# Patient Record
Sex: FEMALE | Race: WHITE
[De-identification: names, ages, dates, MRNs, and addresses within clinical notes are randomized per-mention and may not be internally consistent; named-entity substitution may affect disease eponyms.]

---

## 2020-12-26 ENCOUNTER — HOSPITAL ENCOUNTER (EMERGENCY)
Dept: HOSPITAL 33 - ED | Age: 85
Discharge: HOME | End: 2020-12-26
Payer: MEDICARE

## 2020-12-26 VITALS — SYSTOLIC BLOOD PRESSURE: 138 MMHG | OXYGEN SATURATION: 97 % | DIASTOLIC BLOOD PRESSURE: 66 MMHG | HEART RATE: 66 BPM

## 2020-12-26 DIAGNOSIS — E10.9: ICD-10-CM

## 2020-12-26 DIAGNOSIS — N39.0: Primary | ICD-10-CM

## 2020-12-26 DIAGNOSIS — Z79.899: ICD-10-CM

## 2020-12-26 LAB
GLUCOSE UR-MCNC: >=500 MG/DL
PROT UR STRIP-MCNC: 30 MG/DL
RBC #/AREA URNS HPF: (no result) /HPF (ref 0–2)
WBC #/AREA URNS HPF: >100 /HPF (ref 0–5)

## 2020-12-26 PROCEDURE — 81001 URINALYSIS AUTO W/SCOPE: CPT

## 2020-12-26 PROCEDURE — 87086 URINE CULTURE/COLONY COUNT: CPT

## 2020-12-26 PROCEDURE — 99283 EMERGENCY DEPT VISIT LOW MDM: CPT

## 2020-12-26 PROCEDURE — 87077 CULTURE AEROBIC IDENTIFY: CPT

## 2020-12-26 PROCEDURE — 87186 SC STD MICRODIL/AGAR DIL: CPT

## 2020-12-26 NOTE — ERPHSYRPT
- History of Present Illness


Time Seen by Provider: 12/26/20 12:33


Source: patient


Exam Limitations: no limitations


Patient Subjective Stated Complaint: Pt states "I think I have a bladder 

infection. IT really brurns when I pee."


Triage Nursing Assessment: Pt presented alert and oriented X 3, skin pwd pt 

ambualtes with an upright slow gait, able to speak in clear full setences.  Pt 

urine cloudy with foul smell.


Physician History: 





pt has hx of UTI every year or so and feels like one now.


No abd pain, no N/V, feeling well.


No findings on exam.


DM with sugars running OK.


Timing/Duration: today


Severity: mild


Associated Symptoms: denies symptoms


Allergies/Adverse Reactions: 








No Known Drug Allergies Allergy (Verified 12/26/20 12:05)


   





Home Medications: 








Atenolol 50 mg*** [Tenormin 50 mg***] 50 mg PO DAILY 12/26/20 [History]


Atorvastatin Calcium [Lipitor] 10 mg PO DAILY 12/26/20 [History]


Empagliflozin [Jardiance] 10 mg PO DAILY 12/26/20 [History]


Isosorbide Mononitrate [Isosorbide Mononitrate ER] 60 mg PO DAILY 12/26/20 

[History]


Linagliptin [Tradjenta] 5 mg PO DAILY 12/26/20 [History]


Warfarin Sodium 2 mg PO DAILY 12/26/20 [History]





Hx Tetanus, Diphtheria Vaccination/Date Given: Yes


Hx Influenza Vaccination/Date Given: Yes


Hx Pneumococcal Vaccination/Date Given: Yes


Immunizations Up to Date: Yes





Travel Risk





- International Travel


Have you traveled outside of the country in past 3 weeks: No





- Coronavirus Screening


Are you exhibiting any of the following symptoms?: No





- Review of Systems


Constitutional: No Fever, No Chills


Eyes: No Symptoms


Ears, Nose, & Throat: No Symptoms


Respiratory: No Cough, No Dyspnea


Cardiac: No Chest Pain, No Edema, No Syncope


Abdominal/Gastrointestinal: No Abdominal Pain, No Nausea, No Vomiting, No 

Diarrhea


Genitourinary Symptoms: Dysuria, Frequency


Musculoskeletal: No Back Pain, No Neck Pain


Skin: No Rash


Neurological: No Dizziness, No Focal Weakness, No Sensory Changes


Psychological: No Symptoms


Endocrine: No Symptoms


All Other Systems: Reviewed and Negative





- Past Medical History


Pertinent Past Medical History: Yes


Endocrine Medical History: Diabetes Type I





- Past Surgical History


Past Surgical History: Yes


Other Surgical History: cabg





- Social History


Smoking Status: Never smoker


Exposure to second hand smoke: No


Drug Use: none


Patient Lives Alone: No





- Female History


Hx Pregnant Now: No





- Nursing Vital Signs


Nursing Vital Signs: 





                               Initial Vital Signs











Temperature  97.8 F   12/26/20 11:59


 


Pulse Rate  66   12/26/20 11:59


 


Respiratory Rate  20   12/26/20 11:59


 


Blood Pressure  138/66   12/26/20 11:59


 


O2 Sat by Pulse Oximetry  97   12/26/20 11:59








                                   Pain Scale











Pain Intensity                 2

















- Physical Exam


General Appearance: no apparent distress, alert


Eye Exam: PERRL/EOMI, eyes nml inspection


Ears, Nose, Throat Exam: normal ENT inspection, TMs normal, pharynx normal, 

moist mucous membranes


Neck Exam: normal inspection, non-tender, supple, full range of motion


Respiratory Exam: normal breath sounds, lungs clear, No respiratory distress


Cardiovascular Exam: regular rate/rhythm, normal heart sounds, normal peripheral

 pulses


Gastrointestinal/Abdomen Exam: soft, normal bowel sounds, No tenderness, No mass


Back Exam: normal inspection, normal range of motion, No CVA tenderness, No 

vertebral tenderness


Extremity Exam: normal inspection, normal range of motion, pelvis stable


Neurologic Exam: alert, oriented x 3, cooperative, normal mood/affect, nml 

cerebellar function, nml station & gait, sensation nml, No motor deficits


Skin Exam: normal color, warm, dry, No rash


Lymphatic Exam: No adenopathy


**SpO2 Interpretation**: normal


SpO2: 97


O2 Delivery: Room Air





- Course


Nursing assessment & vital signs reviewed: Yes


Ordered Tests: 





                               Active Orders 24 hr











 Category Date Time Status


 


 CULTURE,URINE Stat Lab  12/26/20 12:10 Received


 


 UA W/RFX UR CULTURE Stat Lab  12/26/20 12:10 Completed











Lab/Rad Data: 





                               Laboratory Results











  12/26/20 Range/Units





  12:10 


 


Urine Color  YELLOW  (YELLOW)  


 


Urine Appearance  CLOUDY  (CLEAR)  


 


Urine pH  5.0  (5-6)  


 


Ur Specific Gravity  1.015  (1.005-1.025)  


 


Urine Protein  30  (Negative)  


 


Urine Ketones  NEGATIVE  (NEGATIVE)  


 


Urine Blood  SMALL  (0-5)  Kit/ul


 


Urine Nitrite  POSITIVE  (NEGATIVE)  


 


Urine Bilirubin  NEGATIVE  (NEGATIVE)  


 


Urine Urobilinogen  NEGATIVE  (0-1)  mg/dL


 


Ur Leukocyte Esterase  LARGE  (NEGATIVE)  


 


Urine WBC (Auto)  >100  (0-5)  /HPF


 


Urine RBC (Auto)  3-5  (0-2)  /HPF


 


U Hyaline Cast (Auto)  3-5  (0-2)  /LPF


 


U Epithel Cells (Auto)  RARE  (FEW)  /HPF


 


Urine Bacteria (Auto)  MODERATE  (NEGATIVE)  /HPF


 


Urine Mucus (Auto)  SLIGHT  (NEGATIVE)  /HPF


 


Urine Culture Reflexed  YES  (NO)  


 


Urine Glucose  >=500  (NEGATIVE)  mg/dL














- Progress


Progress: improved, re-examined


Counseled pt/family regarding: lab results, diagnosis, need for follow-up





- Departure


Departure Disposition: Home


Clinical Impression: 


 UTI (urinary tract infection)





Condition: Good


Critical Care Time: No


Referrals: 


SANJEEV CUNNINGHAM MD [Primary Care Provider] - 


Instructions:  Urinary Tract Infection, Adult (DC)


Additional Instructions: 


Followup with your DrLisandra to recheck urine, after antibiotics, return meantime if 

not improving or any concerns. 


Prescriptions: 


Cephalexin Mh 500 mg** [Keflex 500 mg**] 500 mg PO TID #30 capsule

## 2021-07-02 ENCOUNTER — HOSPITAL ENCOUNTER (EMERGENCY)
Dept: HOSPITAL 33 - ED | Age: 86
Discharge: ANOTHER HOSPITAL | End: 2021-07-02
Payer: MEDICARE

## 2021-07-02 VITALS — SYSTOLIC BLOOD PRESSURE: 182 MMHG | DIASTOLIC BLOOD PRESSURE: 104 MMHG

## 2021-07-02 VITALS — HEART RATE: 84 BPM

## 2021-07-02 VITALS — OXYGEN SATURATION: 99 %

## 2021-07-02 DIAGNOSIS — R53.83: ICD-10-CM

## 2021-07-02 DIAGNOSIS — R79.89: ICD-10-CM

## 2021-07-02 DIAGNOSIS — Z79.01: ICD-10-CM
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
## 2021-07-02 DIAGNOSIS — S72.002A: Primary | ICD-10-CM

## 2021-07-02 DIAGNOSIS — I48.91: ICD-10-CM

## 2021-07-02 DIAGNOSIS — M25.552: ICD-10-CM

## 2021-07-02 DIAGNOSIS — E10.22: ICD-10-CM

## 2021-07-02 DIAGNOSIS — M54.5: ICD-10-CM

## 2021-07-02 DIAGNOSIS — R53.1: ICD-10-CM
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
## 2021-07-02 DIAGNOSIS — N18.9: ICD-10-CM

## 2021-07-02 DIAGNOSIS — Z79.899: ICD-10-CM
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
## 2021-07-02 LAB
ALBUMIN SERPL-MCNC: 4.7 G/DL (ref 3.5–5)
ALP SERPL-CCNC: 77 U/L (ref 38–126)
ALT SERPL-CCNC: 33 U/L (ref 0–35)
ANION GAP SERPL CALC-SCNC: 18.1 MEQ/L (ref 5–15)
AST SERPL QL: 60 U/L (ref 14–36)
BASOPHILS # BLD AUTO: 0.03 10*3/UL (ref 0–0.4)
BASOPHILS NFR BLD AUTO: 0.4 % (ref 0–0.4)
BILIRUB BLD-MCNC: 1.2 MG/DL (ref 0.2–1.3)
BNP SERPL-MCNC: 2070 PG/ML (ref 0–1800)
BUN SERPL-MCNC: 35 MG/DL (ref 7–17)
CALCIUM SPEC-MCNC: 9.8 MG/DL (ref 8.4–10.2)
CHLORIDE SERPL-SCNC: 108 MMOL/L (ref 98–107)
CK SERPL-CCNC: 176 U/L (ref 30–135)
CO2 SERPL-SCNC: 19 MMOL/L (ref 22–30)
CREAT SERPL-MCNC: 1.11 MG/DL (ref 0.52–1.04)
EOSINOPHIL # BLD AUTO: 0.01 10*3/UL (ref 0–0.5)
GFR SERPLBLD BASED ON 1.73 SQ M-ARVRAT: 49.4 ML/MIN
GLUCOSE SERPL-MCNC: 180 MG/DL (ref 74–106)
GLUCOSE UR-MCNC: >=500 MG/DL
HCT VFR BLD AUTO: 51.5 % (ref 35–47)
HGB BLD-MCNC: 16.2 GM/DL (ref 12–16)
INR PPP: 1.05 (ref 0.8–3)
LYMPHOCYTES # SPEC AUTO: 0.78 10*3/UL (ref 1–4.6)
MCH RBC QN AUTO: 32 PG (ref 26–32)
MCHC RBC AUTO-ENTMCNC: 31.5 G/DL (ref 32–36)
MONOCYTES # BLD AUTO: 0.66 10*3/UL (ref 0–1.3)
PLATELET # BLD AUTO: 151 K/MM3 (ref 150–450)
POTASSIUM SERPLBLD-SCNC: 4.7 MMOL/L (ref 3.5–5.1)
PROT SERPL-MCNC: 8.5 G/DL (ref 6.3–8.2)
PROT UR STRIP-MCNC: 30 MG/DL
PROTHROMBIN TIME: 12.4 SECONDS (ref 9.4–12.5)
RBC # BLD AUTO: 5.07 M/MM3 (ref 4.1–5.4)
RBC #/AREA URNS HPF: (no result) /HPF (ref 0–2)
SODIUM SERPL-SCNC: 141 MMOL/L (ref 137–145)
WBC # BLD AUTO: 8.5 K/MM3 (ref 4–10.5)
WBC #/AREA URNS HPF: (no result) /HPF (ref 0–5)

## 2021-07-02 PROCEDURE — 70450 CT HEAD/BRAIN W/O DYE: CPT

## 2021-07-02 PROCEDURE — 99285 EMERGENCY DEPT VISIT HI MDM: CPT

## 2021-07-02 PROCEDURE — 93041 RHYTHM ECG TRACING: CPT

## 2021-07-02 PROCEDURE — 83880 ASSAY OF NATRIURETIC PEPTIDE: CPT

## 2021-07-02 PROCEDURE — 36415 COLL VENOUS BLD VENIPUNCTURE: CPT

## 2021-07-02 PROCEDURE — 81001 URINALYSIS AUTO W/SCOPE: CPT

## 2021-07-02 PROCEDURE — 51702 INSERT TEMP BLADDER CATH: CPT

## 2021-07-02 PROCEDURE — 73502 X-RAY EXAM HIP UNI 2-3 VIEWS: CPT

## 2021-07-02 PROCEDURE — 84484 ASSAY OF TROPONIN QUANT: CPT

## 2021-07-02 PROCEDURE — 85610 PROTHROMBIN TIME: CPT

## 2021-07-02 PROCEDURE — 93005 ELECTROCARDIOGRAM TRACING: CPT

## 2021-07-02 PROCEDURE — 36000 PLACE NEEDLE IN VEIN: CPT

## 2021-07-02 PROCEDURE — 80053 COMPREHEN METABOLIC PANEL: CPT

## 2021-07-02 PROCEDURE — 72131 CT LUMBAR SPINE W/O DYE: CPT

## 2021-07-02 PROCEDURE — 72125 CT NECK SPINE W/O DYE: CPT

## 2021-07-02 PROCEDURE — 82550 ASSAY OF CK (CPK): CPT

## 2021-07-02 PROCEDURE — 85025 COMPLETE CBC W/AUTO DIFF WBC: CPT

## 2021-07-02 PROCEDURE — 71045 X-RAY EXAM CHEST 1 VIEW: CPT

## 2021-07-02 NOTE — XRAY
Indication: Status post fall.



Multiple contiguous axial images obtained through the head without contrast.



Comparison: None



Age-appropriate global atrophy and mild periventricular degenerative

micro-ischemia bilaterally.  No acute intracranial hemorrhage, abnormal

extra-axial fluid collection, or mass effect.  Fourth ventricle is midline

without hydrocephalus.  Bony calvarium intact.  Visualized paranasal sinuses

and mastoid air cells are clear.



Impression: Nonacute senile brain.

## 2021-07-02 NOTE — XRAY
Indication: Status post fall.



Comparison: None



Portable chest rotated and clear with incidental right base calcified

granuloma.  Heart not enlarged with CABG surgery.  Bony thorax intact with

osteopenia, degenerative changes, and moderate levorotoscoliosis centered at

thoracolumbar junction.



Impression: Nonacute chest with chronic features.

## 2021-07-02 NOTE — XRAY
Indication: Status post fall.



Multiple contiguous axial images obtained through the cervical spine.

Sagittal and coronal reformatted images obtained.



Comparison: None



Osseous structures demineralized consistent with patient's age.  Axial images

negative for acute fracture, suspicious bony lesions, or spinal canal

stenosis.  There is mild/moderate multilevel degenerative endplate spurring

and mild/moderate multilevel bilateral degenerative facet hypertrophy greatest

at C4-C6 levels.  Also moderate atlantoaxial degenerative arthropathy.



Sagittal and coronal reformatted images demonstrates cervical lordosis with

C4-C7 disc space narrowing and minimal 1 mm C7 anterolisthesis.  No acute

compression fracture or jumped facet.  Normal appearing craniocervical

junction.



Visualized noncontrasted soft tissues demonstrates moderate scattered carotid

calcifications bilaterally.  Lung apices clear.



Impression:

1.  Negative acute fracture.

2.  Osteopenia and multilevel degenerative spondylosis including minimal C7

spondylolisthesis.

## 2021-07-02 NOTE — XRAY
Indication: Status post fall.



Multiple contiguous axial images obtained through the lumbar spine.  Sagittal

and coronal reformatted images obtained.



Comparison: None



Osseous structures demineralized consistent with patient's age.  Axial images

negative for acute fracture or suspicious bony lesions.  There is

moderate/advanced multilevel thoracolumbar degenerative endplate spurring and

mild/moderate multilevel bilateral degenerative facet hypertrophy.  Incidental

L4-L5 spinous process fixation hardware producing beam artifact.



Sagittal and coronal reformatted images demonstrates moderate dextroscoliosis

centered at L3 and 7-8mm anterolisthesis of L4 and L5.  Moderate/significant

disc space loss.  No acute compression fracture.



Visualized noncontrasted soft tissues demonstrates mild scattered aortoiliac

calcifications.



Impression:

1.  Negative acute fracture.

2.  Osteopenia, multilevel thoracolumbar degenerative spondylosis, grade 1-2

L4 spondylolisthesis with L4-L5 spinous fixation hardware, and dextroscoliosis.

## 2021-07-02 NOTE — ERPHSYRPT
- History of Present Illness


Time Seen by Provider: 07/02/21 11:55


Patient Subjective Stated Complaint: Pt fell at 0400 on a tile floor in her 

bathroom and was unable to get up until medics arrived at approx 1115, pt's husb

and could not hear her, pt c/o of lower back pain


Triage Nursing Assessment: Pt brought to the ER via EMS, hypertensive, rates 

back pain as 10/10, pain to left hip, bruising to medial back, bruising to left 

upper arm, pt denies hitting head or LOC, pulse weaker in right foot, unsure of 

what made her fall, incontinent of stool and urine, EMS noted that pt and 

 have dementia, pt A&O x2/4


Allergies/Adverse Reactions: 








No Known Drug Allergies Allergy (Verified 07/02/21 12:10)


   





Home Medications: 








Atenolol 50 mg*** [Tenormin 50 mg***] 50 mg PO DAILY 12/26/20 [History]


Atorvastatin Calcium [Lipitor] 10 mg PO DAILY 12/26/20 [History]


Empagliflozin [Jardiance] 10 mg PO DAILY 12/26/20 [History]


Isosorbide Mononitrate [Isosorbide Mononitrate ER] 60 mg PO DAILY 12/26/20 

[History]


Linagliptin [Tradjenta] 5 mg PO DAILY 12/26/20 [History]


Warfarin Sodium 2 mg PO DAILY 12/26/20 [History]





Hx Tetanus, Diphtheria Vaccination/Date Given: Yes


Hx Influenza Vaccination/Date Given: Yes


Hx Pneumococcal Vaccination/Date Given: Yes





Travel Risk





- International Travel


Have you traveled outside of the country in past 3 weeks: No





- Coronavirus Screening


Are you exhibiting any of the following symptoms?: No


Close contact with a COVID-19 positive Pt in past 14-21 Days: No





- Vaccine Status


Have you recieved a Covid-19 vaccination: Yes


: [employer]





- Past Medical History


Pertinent Past Medical History: Yes


Endocrine Medical History: Diabetes Type I


Other Medical History: poor historian





- Past Surgical History


Past Surgical History: Yes


Other Surgical History: cabg





- Social History


Smoking Status: Never smoker


Exposure to second hand smoke: No


Drug Use: none


Patient Lives Alone: No





- Female History


Hx Pregnant Now: No





- Nursing Vital Signs


Nursing Vital Signs: 





                               Initial Vital Signs











Temperature  97.0 F   07/02/21 11:53


 


Pulse Rate  107 H  07/02/21 11:53


 


Blood Pressure  191/113   07/02/21 11:53


 


O2 Sat by Pulse Oximetry  99   07/02/21 11:53








                                   Pain Scale











Pain Intensity                 10

















- Physical Exam


SpO2: 99


Ordered Tests: 





                               Active Orders 24 hr











 Category Date Time Status


 


 Cardiac Monitor STAT Care  07/02/21 12:20 Active


 


 EKG-ER Only STAT Care  07/02/21 12:20 Active


 


 IV Insertion STAT Care  07/02/21 12:20 Active


 


 CERVICAL SPINE WO CONTRAST [CT] Stat Exams  07/02/21 12:19 Ordered


 


 CHEST 1 VIEW (PORTABLE) Stat Exams  07/02/21 12:20 Ordered


 


 HEAD WITHOUT CONTRAST [CT] Stat Exams  07/02/21 12:19 Ordered


 


 HIP UNI (2V) INCL PEL IF DONE Stat Exams  07/02/21 Ordered


 


 LUMBAR SPINE W/O [CT] Stat Exams  07/02/21 12:19 Ordered


 


 CBC W DIFF Stat Lab  07/02/21 13:12 Completed


 


 CK-Creatinine Phosphokinase Stat Lab  07/02/21 12:52 Completed


 


 CMP Stat Lab  07/02/21 12:52 Completed


 


 NT PRO BNP Stat Lab  07/02/21 12:52 Completed


 


 TROPONIN Q3H Lab  07/02/21 12:52 Received


 


 TROPONIN Q3H Lab  07/02/21 15:30 Ordered


 


 TROPONIN Q3H Lab  07/02/21 18:30 Ordered


 


 TROPONIN Q3H Lab  07/02/21 21:30 Ordered


 


 TROPONIN Q3H Lab  07/03/21 00:30 Ordered


 


 UA W/RFX UR CULTURE Stat Lab  07/02/21 12:52 Completed








Medication Summary











Generic Name Dose Route Start Last Admin





  Trade Name Mich  PRN Reason Stop Dose Admin


 


Sodium Chloride  1,000 mls @ 100 mls/hr  07/02/21 12:30  07/02/21 12:30





  Sodium Chloride 0.9% 1000 Ml  IV  08/01/21 12:29  100 mls/hr





  .Q10H WILBER   Administration











Lab/Rad Data: 





                           Laboratory Result Diagrams





                                 07/02/21 13:12 





                                 07/02/21 12:52 





                               Laboratory Results











  07/02/21 07/02/21 07/02/21 Range/Units





  13:12 12:52 12:52 


 


WBC  8.5    (4.0-10.5)  K/mm3


 


RBC  5.07    (4.1-5.4)  M/mm3


 


Hgb  16.2 H    (12.0-16.0)  gm/dl


 


Hct  51.5 H    (35-47)  %


 


MCV  101.6 H    ()  fl


 


MCH  32.0    (26-32)  pg


 


MCHC  31.5 L    (32-36)  g/dl


 


RDW  14.4 H    (11.5-14.0)  %


 


Plt Count  151    (150-450)  K/mm3


 


MPV  10.0    (7.5-11.0)  fl


 


Gran %  82.5 H    (36.0-66.0)  %


 


Eos # (Auto)  0.01    (0-0.5)  


 


Absolute Lymphs (auto)  0.78 L    (1.0-4.6)  


 


Absolute Monos (auto)  0.66    (0.0-1.3)  


 


Lymphocytes %  9.2 L    (24.0-44.0)  %


 


Monocytes %  7.8    (0.0-12.0)  %


 


Eosinophils %  0.1    (0.00-5.0)  %


 


Basophils %  0.4    (0.0-0.4)  %


 


Absolute Granulocytes  7.00 H    (1.4-6.9)  


 


Basophils #  0.03    (0-0.4)  


 


Sodium    141  (137-145)  mmol/L


 


Potassium    4.7  (3.5-5.1)  mmol/L


 


Chloride    108 H  ()  mmol/L


 


Carbon Dioxide    19 L  (22-30)  mmol/L


 


Anion Gap    18.1 H  (5-15)  MEQ/L


 


BUN    35 H  (7-17)  mg/dL


 


Creatinine    1.11 H  (0.52-1.04)  mg/dL


 


Estimated GFR    49.4  ML/MIN


 


Glucose    180 H  ()  mg/dL


 


Calcium    9.8  (8.4-10.2)  mg/dL


 


Total Bilirubin    1.20  (0.2-1.3)  mg/dL


 


AST    60 H  (14-36)  U/L


 


ALT    33  (0-35)  U/L


 


Alkaline Phosphatase    77  ()  U/L


 


Creatine Kinase    176 H  ()  U/L


 


NT-Pro-B Natriuret Pep    2070 H  (0-1800)  pg/mL


 


Serum Total Protein    8.5 H  (6.3-8.2)  g/dL


 


Albumin    4.7  (3.5-5.0)  g/dL


 


Urine Color   STRAW   (YELLOW)  


 


Urine Appearance   CLEAR   (CLEAR)  


 


Urine pH   5.0   (5-6)  


 


Ur Specific Gravity   1.011   (1.005-1.025)  


 


Urine Protein   30   (Negative)  


 


Urine Ketones   NEGATIVE   (NEGATIVE)  


 


Urine Blood   SMALL   (0-5)  Kit/ul


 


Urine Nitrite   NEGATIVE   (NEGATIVE)  


 


Urine Bilirubin   NEGATIVE   (NEGATIVE)  


 


Urine Urobilinogen   NEGATIVE   (0-1)  mg/dL


 


Ur Leukocyte Esterase   NEGATIVE   (NEGATIVE)  


 


Urine WBC (Auto)   NONE   (0-5)  /HPF


 


Urine RBC (Auto)   3-5   (0-2)  /HPF


 


U Epithel Cells (Auto)   RARE   (FEW)  /HPF


 


Urine Bacteria (Auto)   RARE   (NEGATIVE)  /HPF


 


Urine Culture Reflexed   NO   (NO)  


 


Urine Glucose   >=500   (NEGATIVE)  mg/dL














- Departure


Referrals: 


SANJEEV CUNNINGHAM MD [Primary Care Provider] -

## 2021-07-02 NOTE — ERPHSYRPT
- History of Present Illness


Time Seen by Provider: 07/02/21 11:55


Source: patient, family, EMS


Exam Limitations: no limitations


Patient Subjective Stated Complaint: Pt fell at 0400 on a tile floor in her 

bathroom and was unable to get up until medics arrived at approx 1115, pt's 

 could not hear her, pt c/o of lower back pain


Triage Nursing Assessment: Pt brought to the ER via EMS, hypertensive, rates 

back pain as 10/10, pain to left hip, bruising to medial back, bruising to left 

upper arm, pt denies hitting head or LOC, pulse weaker in right foot, unsure of 

what made her fall, incontinent of stool and urine, EMS noted that pt and kyle escalante have dementia, pt A&O x2/4


Physician History: 





87 years old female with multiple medical problems including atrial fib flutter,

congestive heart failure, CAD status post CABG, dementia presented in the ER 

with chief complaint of fall 8 hours ago.  Patient reports she used bathroom 

this morning around 4 AM and then collapsed in front of sink landing on hardwood

floor on left hip and back, was weak enough that she stayed there until prior to

arrival when her  found her there.  She is not able to get up because of 

severe pain in the lower back and left hip, sharp shooting, aggravated with 

minimal movements.  Unsure about hitting her head.  No loss of consciousness.  

Denies any chest pain palpitations or shortness of breath before or after the 

fall.  Denies any abdominal pain nausea or vomiting.


Occurred: this morning, hours ago (8)


Injuries/Pain Location: back, pelvis, lower extremity


Loss of Consciousness: no loss of consciousness


Quality: sharpness


Severity of Pain-Max: severe


Severity of Pain-Current: severe


Modifying Factors: Improves With: immobilization.  Worsens With: movement


Associated Symptoms (Fall): back pain, extremity injury, muscle spasms, trouble 

walking


Allergies/Adverse Reactions: 








No Known Drug Allergies Allergy (Verified 07/02/21 12:10)


   





Home Medications: 








Atenolol 50 mg*** [Tenormin 50 mg***] 50 mg PO DAILY 12/26/20 [History]


Atorvastatin Calcium [Lipitor] 10 mg PO DAILY 12/26/20 [History]


Empagliflozin [Jardiance] 10 mg PO DAILY 12/26/20 [History]


Isosorbide Mononitrate [Isosorbide Mononitrate ER] 60 mg PO DAILY 12/26/20 

[History]


Linagliptin [Tradjenta] 5 mg PO DAILY 12/26/20 [History]


Warfarin Sodium 2 mg PO DAILY 12/26/20 [History]





Hx Tetanus, Diphtheria Vaccination/Date Given: Yes


Hx Influenza Vaccination/Date Given: Yes


Hx Pneumococcal Vaccination/Date Given: Yes





Travel Risk





- International Travel


Have you traveled outside of the country in past 3 weeks: No





- Coronavirus Screening


Are you exhibiting any of the following symptoms?: No


Close contact with a COVID-19 positive Pt in past 14-21 Days: No





- Vaccine Status


Have you recieved a Covid-19 vaccination: Yes


: [facility]





- Review of Systems


Constitutional: Fatigue, Weakness


Eyes: No Symptoms


Ears, Nose, & Throat: No Symptoms


Respiratory: No Symptoms


Cardiac: No Symptoms


Abdominal/Gastrointestinal: No Symptoms


Genitourinary Symptoms: No Symptoms


Musculoskeletal: Arthralgias, Back Pain, Fall, Injury, Joint Pain


Skin: No Symptoms


Neurological: No Symptoms


Psychological: No Symptoms


Endocrine: No Symptoms


Hematologic/Lymphatic: No Symptoms


Immunological/Allergic: No Symptoms





- Past Medical History


Pertinent Past Medical History: Yes


Endocrine Medical History: Diabetes Type I


Other Medical History: poor historian





- Past Surgical History


Past Surgical History: Yes


Other Surgical History: cabg





- Social History


Smoking Status: Never smoker


Exposure to second hand smoke: No


Drug Use: none


Patient Lives Alone: No





- Female History


Hx Pregnant Now: No





- Nursing Vital Signs


Nursing Vital Signs: 


                               Initial Vital Signs











Temperature  97.0 F   07/02/21 11:53


 


Pulse Rate  107 H  07/02/21 11:53


 


Blood Pressure  191/113   07/02/21 11:53


 


O2 Sat by Pulse Oximetry  99   07/02/21 11:53








                                   Pain Scale











Pain Intensity                 10

















- Wilson Coma Score


Best Eye Response (Adrien): (4) open spontaneously


Best Verbal Response (Adrien): (5) oriented


Best Motor Response (Adrien): (6) obeys commands


Adrien Total: 15





- Physical Exam


General Appearance: no apparent distress, alert


Head Injury: no evidence of injury, No active bleeding, No Ruiz's Sign, No co

ntusions, No raccoon eyes, No swelling, No tenderness


Eye Exam: PERRL/EOMI, eyes nml inspection


ENT Exam: airway nml, nml ext.inspection, No evidence of ENT injury, No dental 

injury


Neck Exam: supple, trachea midline, normal alignment, normal inspection, c-

collar in place


Respiratory/Chest Exam: normal breath sounds, respiratory distress, No chest 

tenderness


Cardiovascular Exam: normal heart sounds, regular rate/rhythm


Gastrointestinal Exam: soft, normal bowel sounds, No tenderness


Back Exam: normal inspection, vertebral tenderness, decreased range of motion, 

muscle spasm, point tenderness


Extremity Exam: normal inspection, capillary refill <3 sec, limited range of 

motion, bony point tenderness, hip tenderness


Neurologic Exam: alert, oriented x 3, cooperative, CNs II-XII nml as tested, nml

 cerebellar function, sensation nml, No motor deficits, No motor weakness


Skin Exam: normal color


**SpO2 Interpretation**: normal


SpO2: 99


O2 Delivery: Room Air





- Course


EKG Interpreted by Me: RATE (107), A-fib, Left Axis Deviation, Right Bundle 

Branch Block, Non-specific ST Changes, Other (Q wave.  Anteroseptal T wave 

inversion)


Ordered Tests: 


                               Active Orders 24 hr











 Category Date Time Status


 


 Cardiac Monitor STAT Care  07/02/21 12:20 Active


 


 EKG-ER Only STAT Care  07/02/21 12:20 Active


 


 IV Insertion STAT Care  07/02/21 12:20 Active


 


 CERVICAL SPINE WO CONTRAST [CT] Stat Exams  07/02/21 12:19 Completed


 


 CHEST 1 VIEW (PORTABLE) Stat Exams  07/02/21 12:20 Completed


 


 HEAD WITHOUT CONTRAST [CT] Stat Exams  07/02/21 12:19 Completed


 


 HIP UNI (2V) INCL PEL IF DONE Stat Exams  07/02/21 14:16 Completed


 


 LUMBAR SPINE W/O [CT] Stat Exams  07/02/21 12:19 Completed


 


 CBC W DIFF Stat Lab  07/02/21 13:12 Completed


 


 CK-Creatinine Phosphokinase Stat Lab  07/02/21 12:52 Completed


 


 CMP Stat Lab  07/02/21 12:52 Completed


 


 NT PRO BNP Stat Lab  07/02/21 12:52 Completed


 


 PT INR [PROTIME WITH INR] Stat Lab  07/02/21 14:43 Completed


 


 TROPONIN Q3H Lab  07/02/21 12:52 Completed


 


 TROPONIN Q3H Lab  07/02/21 14:30 Completed


 


 TROPONIN Q3H Lab  07/02/21 18:30 Ordered


 


 TROPONIN Q3H Lab  07/02/21 21:30 Ordered


 


 TROPONIN Q3H Lab  07/03/21 00:30 Ordered


 


 UA W/RFX UR CULTURE Stat Lab  07/02/21 12:52 Completed








Medication Summary











Generic Name Dose Route Start Last Admin





  Trade Name Mich  PRN Reason Stop Dose Admin


 


Sodium Chloride  1,000 mls @ 100 mls/hr  07/02/21 12:30  07/02/21 12:30





  Sodium Chloride 0.9% 1000 Ml  IV  08/01/21 12:29  100 mls/hr





  .Q10H WILBER   Administration











Lab/Rad Data: 


                           Laboratory Result Diagrams





                                 07/02/21 13:12 





                                 07/02/21 12:52 





                               Laboratory Results











  07/02/21 07/02/21 07/02/21 Range/Units





  14:43 14:30 13:12 


 


WBC    8.5  (4.0-10.5)  K/mm3


 


RBC    5.07  (4.1-5.4)  M/mm3


 


Hgb    16.2 H  (12.0-16.0)  gm/dl


 


Hct    51.5 H  (35-47)  %


 


MCV    101.6 H  ()  fl


 


MCH    32.0  (26-32)  pg


 


MCHC    31.5 L  (32-36)  g/dl


 


RDW    14.4 H  (11.5-14.0)  %


 


Plt Count    151  (150-450)  K/mm3


 


MPV    10.0  (7.5-11.0)  fl


 


Gran %    82.5 H  (36.0-66.0)  %


 


Eos # (Auto)    0.01  (0-0.5)  


 


Absolute Lymphs (auto)    0.78 L  (1.0-4.6)  


 


Absolute Monos (auto)    0.66  (0.0-1.3)  


 


Lymphocytes %    9.2 L  (24.0-44.0)  %


 


Monocytes %    7.8  (0.0-12.0)  %


 


Eosinophils %    0.1  (0.00-5.0)  %


 


Basophils %    0.4  (0.0-0.4)  %


 


Absolute Granulocytes    7.00 H  (1.4-6.9)  


 


Basophils #    0.03  (0-0.4)  


 


PT  12.4    (9.4-12.5)  SECONDS


 


INR  1.05    (0.8-3.0)  


 


Sodium     (137-145)  mmol/L


 


Potassium     (3.5-5.1)  mmol/L


 


Chloride     ()  mmol/L


 


Carbon Dioxide     (22-30)  mmol/L


 


Anion Gap     (5-15)  MEQ/L


 


BUN     (7-17)  mg/dL


 


Creatinine     (0.52-1.04)  mg/dL


 


Estimated GFR     ML/MIN


 


Glucose     ()  mg/dL


 


Calcium     (8.4-10.2)  mg/dL


 


Total Bilirubin     (0.2-1.3)  mg/dL


 


AST     (14-36)  U/L


 


ALT     (0-35)  U/L


 


Alkaline Phosphatase     ()  U/L


 


Creatine Kinase     ()  U/L


 


Troponin I   0.040 H*   (0.000-0.034)  ng/mL


 


NT-Pro-B Natriuret Pep     (0-1800)  pg/mL


 


Serum Total Protein     (6.3-8.2)  g/dL


 


Albumin     (3.5-5.0)  g/dL


 


Urine Color     (YELLOW)  


 


Urine Appearance     (CLEAR)  


 


Urine pH     (5-6)  


 


Ur Specific Gravity     (1.005-1.025)  


 


Urine Protein     (Negative)  


 


Urine Ketones     (NEGATIVE)  


 


Urine Blood     (0-5)  Kit/ul


 


Urine Nitrite     (NEGATIVE)  


 


Urine Bilirubin     (NEGATIVE)  


 


Urine Urobilinogen     (0-1)  mg/dL


 


Ur Leukocyte Esterase     (NEGATIVE)  


 


Urine WBC (Auto)     (0-5)  /HPF


 


Urine RBC (Auto)     (0-2)  /HPF


 


U Epithel Cells (Auto)     (FEW)  /HPF


 


Urine Bacteria (Auto)     (NEGATIVE)  /HPF


 


Urine Culture Reflexed     (NO)  


 


Urine Glucose     (NEGATIVE)  mg/dL














  07/02/21 07/02/21 07/02/21 Range/Units





  12:52 12:52 12:52 


 


WBC     (4.0-10.5)  K/mm3


 


RBC     (4.1-5.4)  M/mm3


 


Hgb     (12.0-16.0)  gm/dl


 


Hct     (35-47)  %


 


MCV     ()  fl


 


MCH     (26-32)  pg


 


MCHC     (32-36)  g/dl


 


RDW     (11.5-14.0)  %


 


Plt Count     (150-450)  K/mm3


 


MPV     (7.5-11.0)  fl


 


Gran %     (36.0-66.0)  %


 


Eos # (Auto)     (0-0.5)  


 


Absolute Lymphs (auto)     (1.0-4.6)  


 


Absolute Monos (auto)     (0.0-1.3)  


 


Lymphocytes %     (24.0-44.0)  %


 


Monocytes %     (0.0-12.0)  %


 


Eosinophils %     (0.00-5.0)  %


 


Basophils %     (0.0-0.4)  %


 


Absolute Granulocytes     (1.4-6.9)  


 


Basophils #     (0-0.4)  


 


PT     (9.4-12.5)  SECONDS


 


INR     (0.8-3.0)  


 


Sodium    141  (137-145)  mmol/L


 


Potassium    4.7  (3.5-5.1)  mmol/L


 


Chloride    108 H  ()  mmol/L


 


Carbon Dioxide    19 L  (22-30)  mmol/L


 


Anion Gap    18.1 H  (5-15)  MEQ/L


 


BUN    35 H  (7-17)  mg/dL


 


Creatinine    1.11 H  (0.52-1.04)  mg/dL


 


Estimated GFR    49.4  ML/MIN


 


Glucose    180 H  ()  mg/dL


 


Calcium    9.8  (8.4-10.2)  mg/dL


 


Total Bilirubin    1.20  (0.2-1.3)  mg/dL


 


AST    60 H  (14-36)  U/L


 


ALT    33  (0-35)  U/L


 


Alkaline Phosphatase    77  ()  U/L


 


Creatine Kinase    176 H  ()  U/L


 


Troponin I  0.041 H*    (0.000-0.034)  ng/mL


 


NT-Pro-B Natriuret Pep    2070 H  (0-1800)  pg/mL


 


Serum Total Protein    8.5 H  (6.3-8.2)  g/dL


 


Albumin    4.7  (3.5-5.0)  g/dL


 


Urine Color   STRAW   (YELLOW)  


 


Urine Appearance   CLEAR   (CLEAR)  


 


Urine pH   5.0   (5-6)  


 


Ur Specific Gravity   1.011   (1.005-1.025)  


 


Urine Protein   30   (Negative)  


 


Urine Ketones   NEGATIVE   (NEGATIVE)  


 


Urine Blood   SMALL   (0-5)  Kit/ul


 


Urine Nitrite   NEGATIVE   (NEGATIVE)  


 


Urine Bilirubin   NEGATIVE   (NEGATIVE)  


 


Urine Urobilinogen   NEGATIVE   (0-1)  mg/dL


 


Ur Leukocyte Esterase   NEGATIVE   (NEGATIVE)  


 


Urine WBC (Auto)   NONE   (0-5)  /HPF


 


Urine RBC (Auto)   3-5   (0-2)  /HPF


 


U Epithel Cells (Auto)   RARE   (FEW)  /HPF


 


Urine Bacteria (Auto)   RARE   (NEGATIVE)  /HPF


 


Urine Culture Reflexed   NO   (NO)  


 


Urine Glucose   >=500   (NEGATIVE)  mg/dL














- Progress


Progress: improved, pain not gone completely, re-examined


Progress Note: 





07/02/21 14:45


87-year-old is evaluated in the ER for with left hip and low back injury without

obvious head injury/loss of consciousness.  Patient is on Coumadin, I have 

obtained CT head and cervical spine which are negative for any acute trauma 

related findings.  CT lumbar spine is negative for acute fracture or subl

uxation.  She does have a PERC rock fracture left hip.  Chest x-ray negative for

any acute cardiopulmonary findings.  EKG showed atrial flutter rate controlled. 

Has mildly elevated troponin of 0.04.  She does not have any chest pain.  Since 

she has a hip fracture, will hold off on antiplatelets as patient probably needs

surgery in 1 or 2 days.  She has chronic kidney disease which is stable around 

baseline.  


07/02/21 15:59


Patient wanted to go to [place], discussed with Dr. EDMUND Mariscal orthopedic surgery 

and Dr. Stokes hospitalist, reviewed history, work-up, current management, 

agreed with transfer.


Counseled pt/family regarding: lab results, diagnosis, rad results





- Departure


Departure Disposition: Transfer


Clinical Impression: 


 Elevated troponin





Hip fracture


Qualifiers:


 Encounter type: initial encounter Fracture type: closed Laterality: left 

Qualified Code(s): S72.002A - Fracture of unspecified part of neck of left 

femur, initial encounter for closed fracture





Fall


Qualifiers:


 Encounter type: initial encounter Qualified Code(s): W19.XXXA - Unspecified 

fall, initial encounter





Condition: Stable


Critical Care Time: No


Referrals: 


SANJEEV CUNNINGHAM MD [Primary Care Provider] -

## 2021-07-02 NOTE — XRAY
Indication: Status post fall.



Comparison: None



AP and crosstable lateral left hip demonstrates nondisplaced intertrochanteric

fracture.  Elsewhere osteopenia and moderate lower lumbar degenerative

spondylosis with L4-L5 spinous process fusion hardware.

## 2021-11-20 ENCOUNTER — HOSPITAL ENCOUNTER (EMERGENCY)
Dept: HOSPITAL 33 - ED | Age: 86
Discharge: ANOTHER HOSPITAL | End: 2021-11-20
Payer: MEDICARE

## 2021-11-20 DIAGNOSIS — I10: ICD-10-CM

## 2021-11-20 DIAGNOSIS — R77.8: ICD-10-CM

## 2021-11-20 DIAGNOSIS — Z79.01: ICD-10-CM

## 2021-11-20 DIAGNOSIS — R31.9: ICD-10-CM

## 2021-11-20 DIAGNOSIS — E78.5: ICD-10-CM

## 2021-11-20 DIAGNOSIS — F03.90: ICD-10-CM

## 2021-11-20 DIAGNOSIS — Z79.4: ICD-10-CM

## 2021-11-20 DIAGNOSIS — E10.65: ICD-10-CM

## 2021-11-20 DIAGNOSIS — N39.0: Primary | ICD-10-CM

## 2021-11-20 DIAGNOSIS — I48.20: ICD-10-CM
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
## 2021-11-20 DIAGNOSIS — Z79.84: ICD-10-CM

## 2021-11-20 LAB
ALBUMIN SERPL-MCNC: 4.6 G/DL (ref 3.5–5)
ALP SERPL-CCNC: 133 U/L (ref 38–126)
ALT SERPL-CCNC: 24 U/L (ref 0–35)
ANION GAP SERPL CALC-SCNC: 18.8 MEQ/L (ref 5–15)
AST SERPL QL: 40 U/L (ref 14–36)
BASOPHILS # BLD AUTO: 0.02 10*3/UL (ref 0–0.4)
BASOPHILS NFR BLD AUTO: 0.2 % (ref 0–0.4)
BILIRUB BLD-MCNC: 1 MG/DL (ref 0.2–1.3)
BNP SERPL-MCNC: 1200 PG/ML (ref 0–1800)
BUN SERPL-MCNC: 21 MG/DL (ref 7–17)
CALCIUM SPEC-MCNC: 10.1 MG/DL (ref 8.4–10.2)
CHLORIDE SERPL-SCNC: 100 MMOL/L (ref 98–107)
CO2 SERPL-SCNC: 25 MMOL/L (ref 22–30)
CREAT SERPL-MCNC: 1.05 MG/DL (ref 0.52–1.04)
EOSINOPHIL # BLD AUTO: 0.07 10*3/UL (ref 0–0.5)
GFR SERPLBLD BASED ON 1.73 SQ M-ARVRAT: 52.7 ML/MIN
GLUCOSE SERPL-MCNC: 417 MG/DL (ref 74–106)
GLUCOSE UR-MCNC: >=500 MG/DL
HCT VFR BLD AUTO: 53.5 % (ref 35–47)
HGB BLD-MCNC: 16.8 GM/DL (ref 12–16)
INR PPP: 1.33 (ref 0.8–3)
LYMPHOCYTES # SPEC AUTO: 1.23 10*3/UL (ref 1–4.6)
MAGNESIUM SERPL-MCNC: 2 MG/DL (ref 1.6–2.3)
MCH RBC QN AUTO: 30.3 PG (ref 26–32)
MCHC RBC AUTO-ENTMCNC: 31.4 G/DL (ref 32–36)
MONOCYTES # BLD AUTO: 0.51 10*3/UL (ref 0–1.3)
PLATELET # BLD AUTO: 227 K/MM3 (ref 150–450)
POTASSIUM SERPLBLD-SCNC: 4.3 MMOL/L (ref 3.5–5.1)
PROT SERPL-MCNC: 8.4 G/DL (ref 6.3–8.2)
PROT UR STRIP-MCNC: 100 MG/DL
PROTHROMBIN TIME: 15.7 SECONDS (ref 9.4–12.5)
RBC # BLD AUTO: 5.54 M/MM3 (ref 4.1–5.4)
SODIUM SERPL-SCNC: 140 MMOL/L (ref 137–145)
TROPONIN T SERPL HS-MCNC: 0.04 NG/ML (ref 0–0.03)
WBC # BLD AUTO: 8.2 K/MM3 (ref 4–10.5)
WBC #/AREA URNS HPF: >100 /HPF (ref 0–5)

## 2021-11-20 PROCEDURE — 73502 X-RAY EXAM HIP UNI 2-3 VIEWS: CPT

## 2021-11-20 PROCEDURE — 99285 EMERGENCY DEPT VISIT HI MDM: CPT

## 2021-11-20 PROCEDURE — 72125 CT NECK SPINE W/O DYE: CPT

## 2021-11-20 PROCEDURE — 93005 ELECTROCARDIOGRAM TRACING: CPT

## 2021-11-20 PROCEDURE — 85025 COMPLETE CBC W/AUTO DIFF WBC: CPT

## 2021-11-20 PROCEDURE — 96372 THER/PROPH/DIAG INJ SC/IM: CPT

## 2021-11-20 PROCEDURE — 85610 PROTHROMBIN TIME: CPT

## 2021-11-20 PROCEDURE — 70450 CT HEAD/BRAIN W/O DYE: CPT

## 2021-11-20 PROCEDURE — 71045 X-RAY EXAM CHEST 1 VIEW: CPT

## 2021-11-20 PROCEDURE — 80053 COMPREHEN METABOLIC PANEL: CPT

## 2021-11-20 PROCEDURE — 96365 THER/PROPH/DIAG IV INF INIT: CPT

## 2021-11-20 PROCEDURE — 87086 URINE CULTURE/COLONY COUNT: CPT

## 2021-11-20 PROCEDURE — 83880 ASSAY OF NATRIURETIC PEPTIDE: CPT

## 2021-11-20 PROCEDURE — 81001 URINALYSIS AUTO W/SCOPE: CPT

## 2021-11-20 PROCEDURE — 84484 ASSAY OF TROPONIN QUANT: CPT

## 2021-11-20 PROCEDURE — 36415 COLL VENOUS BLD VENIPUNCTURE: CPT

## 2021-11-20 PROCEDURE — 83735 ASSAY OF MAGNESIUM: CPT

## 2021-11-20 NOTE — ERPHSYRPT
- History of Present Illness


Time Seen by Provider: 11/20/21 11:21


Source: patient, EMS


Exam Limitations: no limitations


Patient Subjective Stated Complaint: Fall


Triage Nursing Assessment: Patient brought into ED via EMS and transferred to 

bed with assist of 2. Patient A+O x 2 disoriented to time. Patient states she 

fell while getting out of bed landing her on bottom while trying to go to 

restroom. EMS states patient was urine soaked with feces on susan feet.  Patient 

poor historian. Patient denies pain or discomfort.


Physician History: 





87 years old female with history of coronary artery disease status post CABG, a

trial flutter/fibrillation on Coumadin, hypertension, hyperlipidemia, diabetes 

mellitus, baseline dementia, chronic back pain and recent fracture left hip 

status post ORIF presented in the ER after she tried to get up from bed and 

landed on her left hip and was unable to get up.  Did not remember hitting her 

head or loss of consciousness.  Patient was weak enough to get up.  She has a 

chronic difficulty movements of left lower extremity after recent fall and 

fracture repair.  She denies any headache, dizziness or lightheadedness.  No 

chest pain or palpitation/shortness of breath.  No fever or chills reported.  On

presentation patient was soaked in urine.  She is awake alert but not fully dave

ented.  Per  this is her baseline.


Occurred: just prior to arrival


Reason for Fall: lightheaded


Injuries/Pain Location: pelvis


Loss of Consciousness: no loss of consciousness


Quality: dullness


Severity of Pain-Max: mild


Severity of Pain-Current: mild


Modifying Factors: Improves With: nothing


Associated Symptoms (Fall): denies symptoms


Allergies/Adverse Reactions: 








No Known Drug Allergies Allergy (Verified 11/20/21 10:14)


   





Home Medications: 








Atorvastatin Calcium [Lipitor] 10 mg PO DAILY 12/26/20 [History]


Empagliflozin [Jardiance] 10 mg PO DAILY 12/26/20 [History]


Linagliptin [Tradjenta] 5 mg PO DAILY 12/26/20 [History]


Warfarin Sodium 2 mg PO DAILY 12/26/20 [History]


Lisinopril 20 mg*** [Zestril 20 MG***] 0.5 tab PO DAILY 11/20/21 [History]


Memantine HCl 5 mg*** [Namenda 5 MG***] 1 tab PO BID 11/20/21 [History]


Mirabegron [Myrbetriq] 1 tab PO DAILY 11/20/21 [History]


Rivaroxaban [Xarelto] 1 tab PO DAILY 11/20/21 [History]


Solifenacin Succinate [Vesicare] 1 tab PO DAILY 11/20/21 [History]





Hx Tetanus, Diphtheria Vaccination/Date Given: Yes


Hx Influenza Vaccination/Date Given: Yes


Hx Pneumococcal Vaccination/Date Given: Yes


Immunizations Up to Date: Yes





Travel Risk





- International Travel


Have you traveled outside of the country in past 3 weeks: No





- Coronavirus Screening


Are you exhibiting any of the following symptoms?: No


Close contact with a COVID-19 positive Pt in past 14-21 Days: No





- Vaccine Status


Have you recieved a Covid-19 vaccination: Yes


: Wayna





- Review of Systems


Constitutional: No Symptoms


Eyes: No Symptoms


Ears, Nose, & Throat: No Symptoms


Respiratory: No Symptoms


Cardiac: No Symptoms


Abdominal/Gastrointestinal: No Symptoms


Genitourinary Symptoms: No Symptoms


Musculoskeletal: No Symptoms


Skin: No Symptoms


Neurological: No Symptoms


Psychological: No Symptoms


Endocrine: No Symptoms


Hematologic/Lymphatic: No Symptoms


Immunological/Allergic: No Symptoms





- Past Medical History


Pertinent Past Medical History: Yes


Endocrine Medical History: Diabetes Type I


Other Medical History: poor historian





- Past Surgical History


Past Surgical History: Yes


Other Surgical History: cabg





- Social History


Smoking Status: Never smoker


Exposure to second hand smoke: No


Drug Use: none


Patient Lives Alone: No





- Female History


Hx Pregnant Now: No





- Nursing Vital Signs


Nursing Vital Signs: 


                               Initial Vital Signs











Temperature  97.8 F   11/20/21 10:15


 


Pulse Rate  122 H  11/20/21 10:15


 


Respiratory Rate  16   11/20/21 10:15


 


Blood Pressure  129/101   11/20/21 10:15








                                   Pain Scale











Pain Intensity                 0

















- [place] Coma Score


Best Eye Response (Adrien): (4) open spontaneously


Best Verbal Response ([place]): (5) oriented


Best Motor Response ([place]): (6) obeys commands


Adrien Total: 15





- Physical Exam


General Appearance: no apparent distress, alert


Head Injury: no evidence of injury, No active bleeding, No Ruiz's Sign, No 

contusions, No ecchymosis


Eye Exam: PERRL/EOMI, eyes nml inspection


ENT Exam: airway nml, No evidence of ENT injury


Neck Exam: supple, trachea midline, full range of motion, normal alignment, 

normal inspection


Respiratory/Chest Exam: normal breath sounds, respiratory distress, No chest 

tenderness


Cardiovascular Exam: normal heart sounds, tachycardia


Gastrointestinal Exam: soft, normal bowel sounds, No tenderness


Back Exam: normal inspection, normal range of motion


Extremity Exam: normal inspection, normal range of motion, capillary refill <3 

sec, pelvis stable


Neurologic Exam: alert, oriented x 3, cooperative, CNs II-XII nml as tested, 

sensation nml


Skin Exam: normal color


**SpO2 Interpretation**: normal


SpO2: 99


O2 Delivery: Room Air





- Course


EKG Interpreted by Me: RATE (121, atrial flutter), Right Axis Deviation, 

prolonged QT interval, NORMAL QRS, Non-specific ST Changes (Second EKG.  Time 

1541.  Rate 82.  Atrial flutter, right axis deviation, right bundle branch 

block.  Q waves.)


Ordered Tests: 


                               Active Orders 24 hr











 Category Date Time Status


 


 EKG-ER Only STAT Care  11/20/21 10:21 Active


 


 CERVICAL SPINE WO CONTRAST [CT] Stat Exams  11/20/21 10:21 Taken


 


 CHEST 1 VIEW (PORTABLE) Stat Exams  11/20/21 10:25 Taken


 


 HEAD WITHOUT CONTRAST [CT] Stat Exams  11/20/21 10:21 Taken


 


 HIP UNI (2V) INCL PEL IF DONE Stat Exams  11/20/21 Taken


 


 CBC W DIFF Stat Lab  11/20/21 09:45 Completed


 


 CMP Routine Lab  11/20/21 09:45 Completed


 


 CULTURE,URINE Stat Lab  11/20/21 10:25 Ordered


 


 MAG [MAGNESIUM] Stat Lab  11/20/21 09:45 Completed


 


 NT PRO BNP Stat Lab  11/20/21 09:45 Completed


 


 PROTIME WITH INR Stat Lab  11/20/21 09:45 Completed


 


 TROPONIN Q3H Lab  11/20/21 09:45 Completed


 


 TROPONIN Q3H Lab  11/20/21 14:20 Received


 


 TROPONIN Q3H Lab  11/20/21 16:05 Received


 


 TROPONIN Q3H Lab  11/20/21 19:30 Ordered


 


 TROPONIN Q3H Lab  11/20/21 22:30 Ordered


 


 UA W/RFX UR CULTURE Stat Lab  11/20/21 10:45 Completed








Medication Summary











Generic Name Dose Route Start Last Admin





  Trade Name Freq  PRN Reason Stop Dose Admin


 


Sodium Chloride  500 mls @ 50 mls/hr  11/20/21 11:45  11/20/21 11:34





  Sodium Chloride 0.9% 500 Ml  IV  12/20/21 11:44  50 mls/hr





  .Q10H WILBER   Administration


 


Diltiazem HCl  100 mls @ 5 mls/hr  11/20/21 15:33 





  Cardizem Drip 100 Mg/100 Ml D5w  IV  12/20/21 15:32 





  .Q20H PRN  





  HEART RATE/ A-FIB  





  Protocol  





  5 MG/HR  














Discontinued Medications














Generic Name Dose Route Start Last Admin





  Trade Name Freq  PRN Reason Stop Dose Admin


 


Ceftriaxone Sodium/Dextrose  2 g in 50 mls @ 100 mls/hr  11/20/21 12:15  

11/20/21 12:55





  Rocephin 2 Gm-D5w 50ml Bag**  IV  11/20/21 12:44  Infused





  STAT STA   Infusion


 


Ceftriaxone Sodium/Dextrose  Confirm  11/20/21 12:18 





  Rocephin 2 Gm-D5w 50ml Bag**  Administered  11/20/21 12:19 





  Dose  





  2 g in 50 mls @ ud  





  IV  





  .STK-MED ONE  


 


Insulin Human Regular  8 unit  11/20/21 15:14  11/20/21 15:51





  Insulin Regular, Human 1 Unit  SQ  11/20/21 15:15  8 unit





  STAT ONE   Administration


 


Insulin Human Regular  Confirm  11/20/21 15:50 





  Insulin Regular, Human 1 Unit  Administered  11/20/21 15:51 





  Dose  





  8 unit  





  .ROUTE  





  .STK-MED ONE  


 


Metoprolol Tartrate  5 mg  11/20/21 13:29  11/20/21 13:33





  Metoprolol Tartrate 5 Mg/5 Ml Injection  IV  11/20/21 13:30  5 mg





  STAT ONE   Administration


 


Metoprolol Tartrate  Confirm  11/20/21 13:32 





  Metoprolol Tartrate 5 Mg/5 Ml Injection  Administered  11/20/21 13:33 





  Dose  





  5 mg  





  IV  





  .STK-MED ONE  











Lab/Rad Data: 


                           Laboratory Result Diagrams





                                 11/20/21 09:45 





                                 11/20/21 09:45 





                               Laboratory Results











  11/20/21 11/20/21 11/20/21 Range/Units





  10:45 09:45 09:45 


 


WBC     (4.0-10.5)  K/mm3


 


RBC     (4.1-5.4)  M/mm3


 


Hgb     (12.0-16.0)  gm/dl


 


Hct     (35-47)  %


 


MCV     ()  fl


 


MCH     (26-32)  pg


 


MCHC     (32-36)  g/dl


 


RDW     (11.5-14.0)  %


 


Plt Count     (150-450)  K/mm3


 


MPV     (7.5-11.0)  fl


 


Gran %     (36.0-66.0)  %


 


Eos # (Auto)     (0-0.5)  


 


Absolute Lymphs (auto)     (1.0-4.6)  


 


Absolute Monos (auto)     (0.0-1.3)  


 


Lymphocytes %     (24.0-44.0)  %


 


Monocytes %     (0.0-12.0)  %


 


Eosinophils %     (0.00-5.0)  %


 


Basophils %     (0.0-0.4)  %


 


Absolute Granulocytes     (1.4-6.9)  


 


Basophils #     (0-0.4)  


 


PT   15.7 H   (9.4-12.5)  SECONDS


 


INR   1.33   (0.8-3.0)  


 


Sodium     (137-145)  mmol/L


 


Potassium     (3.5-5.1)  mmol/L


 


Chloride     ()  mmol/L


 


Carbon Dioxide     (22-30)  mmol/L


 


Anion Gap     (5-15)  MEQ/L


 


BUN     (7-17)  mg/dL


 


Creatinine     (0.52-1.04)  mg/dL


 


Estimated GFR     ML/MIN


 


Glucose     ()  mg/dL


 


Calcium     (8.4-10.2)  mg/dL


 


Magnesium    2.0  (1.6-2.3)  mg/dL


 


Total Bilirubin     (0.2-1.3)  mg/dL


 


AST     (14-36)  U/L


 


ALT     (0-35)  U/L


 


Alkaline Phosphatase     ()  U/L


 


Troponin I     (0.000-0.034)  ng/mL


 


NT-Pro-B Natriuret Pep    1200  (0-1800)  pg/mL


 


Serum Total Protein     (6.3-8.2)  g/dL


 


Albumin     (3.5-5.0)  g/dL


 


Urine Color  YELLOW    (YELLOW)  


 


Urine Appearance  TURBID    (CLEAR)  


 


Urine pH  5.0    (5-6)  


 


Ur Specific Gravity  1.016    (1.005-1.025)  


 


Urine Protein  100    (Negative)  


 


Urine Ketones  NEGATIVE    (NEGATIVE)  


 


Urine Blood  MODERATE    (0-5)  Kit/ul


 


Urine Nitrite  NEGATIVE    (NEGATIVE)  


 


Urine Bilirubin  NEGATIVE    (NEGATIVE)  


 


Urine Urobilinogen  NEGATIVE    (0-1)  mg/dL


 


Ur Leukocyte Esterase  LARGE    (NEGATIVE)  


 


Urine WBC (Auto)  >100    (0-5)  /HPF


 


Urine RBC (Auto)  16-25    (0-2)  /HPF


 


U Epithel Cells (Auto)  NONE    (FEW)  /HPF


 


Urine Bacteria (Auto)  RARE    (NEGATIVE)  /HPF


 


Urine Culture Reflexed  ORDERED SEPARATELY    (NO)  


 


Urine Glucose  >=500    (NEGATIVE)  mg/dL














  11/20/21 11/20/21 Range/Units





  09:45 09:45 


 


WBC   8.2  (4.0-10.5)  K/mm3


 


RBC   5.54 H  (4.1-5.4)  M/mm3


 


Hgb   16.8 H  (12.0-16.0)  gm/dl


 


Hct   53.5 H  (35-47)  %


 


MCV   96.6  ()  fl


 


MCH   30.3  (26-32)  pg


 


MCHC   31.4 L  (32-36)  g/dl


 


RDW   14.4 H  (11.5-14.0)  %


 


Plt Count   227  (150-450)  K/mm3


 


MPV   10.9  (7.5-11.0)  fl


 


Gran %   77.8 H  (36.0-66.0)  %


 


Eos # (Auto)   0.07  (0-0.5)  


 


Absolute Lymphs (auto)   1.23  (1.0-4.6)  


 


Absolute Monos (auto)   0.51  (0.0-1.3)  


 


Lymphocytes %   14.9 L  (24.0-44.0)  %


 


Monocytes %   6.2  (0.0-12.0)  %


 


Eosinophils %   0.9  (0.00-5.0)  %


 


Basophils %   0.2  (0.0-0.4)  %


 


Absolute Granulocytes   6.40  (1.4-6.9)  


 


Basophils #   0.02  (0-0.4)  


 


PT    (9.4-12.5)  SECONDS


 


INR    (0.8-3.0)  


 


Sodium  140   (137-145)  mmol/L


 


Potassium  4.3   (3.5-5.1)  mmol/L


 


Chloride  100   ()  mmol/L


 


Carbon Dioxide  25   (22-30)  mmol/L


 


Anion Gap  18.8 H   (5-15)  MEQ/L


 


BUN  21 H   (7-17)  mg/dL


 


Creatinine  1.05 H   (0.52-1.04)  mg/dL


 


Estimated GFR  52.7   ML/MIN


 


Glucose  417 H   ()  mg/dL


 


Calcium  10.1   (8.4-10.2)  mg/dL


 


Magnesium    (1.6-2.3)  mg/dL


 


Total Bilirubin  1.00   (0.2-1.3)  mg/dL


 


AST  40 H   (14-36)  U/L


 


ALT  24   (0-35)  U/L


 


Alkaline Phosphatase  133 H   ()  U/L


 


Troponin I  0.042 H*   (0.000-0.034)  ng/mL


 


NT-Pro-B Natriuret Pep    (0-1800)  pg/mL


 


Serum Total Protein  8.4 H   (6.3-8.2)  g/dL


 


Albumin  4.6   (3.5-5.0)  g/dL


 


Urine Color    (YELLOW)  


 


Urine Appearance    (CLEAR)  


 


Urine pH    (5-6)  


 


Ur Specific Gravity    (1.005-1.025)  


 


Urine Protein    (Negative)  


 


Urine Ketones    (NEGATIVE)  


 


Urine Blood    (0-5)  Kit/ul


 


Urine Nitrite    (NEGATIVE)  


 


Urine Bilirubin    (NEGATIVE)  


 


Urine Urobilinogen    (0-1)  mg/dL


 


Ur Leukocyte Esterase    (NEGATIVE)  


 


Urine WBC (Auto)    (0-5)  /HPF


 


Urine RBC (Auto)    (0-2)  /HPF


 


U Epithel Cells (Auto)    (FEW)  /HPF


 


Urine Bacteria (Auto)    (NEGATIVE)  /HPF


 


Urine Culture Reflexed    (NO)  


 


Urine Glucose    (NEGATIVE)  mg/dL














- Progress


Progress: improved, re-examined


Progress Note: 





11/20/21 15:55


87-year-old is evaluated for fall with generalized weakness to get up.  She does

 not have any focal neuro deficit on presentation in the ER.  She has little 

tenderness and pain with left hip movement and x-rays are obtained, reviewed by 

me did not reveal any obvious fracture dislocation and implant is well in place.

  EKG showed atrial fibrillation with RVR.  Patient felt dry and I have given 

her a small bolus of fluid and feeling better but still have tachycardia.  She 

is given Lopressor which did help with a heart rate for a bit but was again RVR.

  Started on Cardizem drip.  Patient work-up showed normal white count, 

chemistry profile showed some element of dehydration with a gap of 18.  Initial 

troponin 0 0.042 and repeat is 0.07 and patient does not have any chest pain at 

all.  Repeat EKG also did not show any ST elevations.  Chest x-ray reviewed by 

me did not reveal any obvious acute findings.  CT head and cervical spine is 

obtained which is negative and c-collar is removed.  Does have UTI and given a 

dose of Rocephin.  Discussed with Dr. Anaya, reviewed history, work-up, 

recommended transfer to facility with cardiology services.  Plan discussed with 

patient and  who understand and agree with it.  [facility] 

[facility].








11/20/21 16:08


Discussed with Dr. [facility], reviewed history work-up and 

agreed with transfer plan.


Discussed with : Jarrett, Other


Counseled pt/family regarding: lab results, diagnosis, need for follow-up, rad 

results





- Departure


Departure Disposition: Transfer


Clinical Impression: 


 Fall, Elevated troponin, Atrial fibrillation with RVR, Hyperglycemia





UTI (urinary tract infection)


Qualifiers:


 Urinary tract infection type: site unspecified Hematuria presence: with 

hematuria Qualified Code(s): N39.0 - Urinary tract infection, site not specified





Condition: Stable


Critical Care Time: No


Referrals: 


SANJEEV CUNNINGHAM MD [Primary Care Provider] - Follow up/PCP as directed

## 2021-11-20 NOTE — XRAY
Indication: Status post fall.  Confusion.



Multiple contiguous axial images obtained through the cervical spine.

Sagittal and coronal reformatted images obtained.



Comparison: July 2, 2021.



Stable osteopenia.  Axial images remain negative for acute fracture,

suspicious bony lesions, or spinal canal stenosis.  There remains

mild/moderate multilevel degenerative endplate spurring, mild/moderate

multilevel bilateral degenerative facet hypertrophy, and moderate atlantoaxial

degenerative arthropathy.  Sagittal and coronal reformatted images again

demonstrate normal alignment with C4-C7 degenerative disc space narrowing and

1 mm C7 anterolisthesis.  No acute compression fracture or jumped facet.

Normal appearing craniocervical junction.



Visualized noncontrasted soft tissues again demonstrates moderate bilateral

carotid calcifications.



Impression:

1.  Continued negative acute fracture.

2.  Again osteopenia, multilevel degenerative spondylosis, and minimal grade 1

C7 spondylolisthesis.



Common: Preliminary interpretation made by [facility].  No critical discrepancy.

## 2021-11-20 NOTE — XRAY
Indication: Status post fall.  Confusion.



Multiple contiguous axial images obtained through the head without contrast.



Comparison: July 2, 2021.



Again age-appropriate global atrophy, mild periventricular degenerative

micro-ischemia, and remote lacunar infarct right external capsule.  No acute

intracranial hemorrhage, abnormal extra-axial fluid collection, or mass

effect.  Fourth ventricle is midline without hydrocephalus.  Bony calvarium

intact.  Visualized paranasal sinuses and mastoid air cells are clear.



Impression: Continued nonacute senile brain with remote lacunar infarct right

external capsule.



Common: Preliminary interpretation made by [facility].  No critical discrepancy.

## 2021-11-20 NOTE — XRAY
Indication: Status post fall.



Comparison: July 2, 2021.



2 view left hip demonstrates new gamma nail, intramedullary negrita, and distal

transverse screw fixating previous intertrochanteric fracture.  Stable

osteopenia, lower lumbar spinous process fusion hardware, and scattered

vascular calcifications.  No other bony, articular, or soft tissue

abnormalities.

## 2021-11-20 NOTE — XRAY
Indication: Confusion.  Status post fall.



Comparison: July 2, 2021.



Portable chest hyperinflated and clear.  Heart not enlarged again with CABG.

Bony thorax intact again with osteopenia and degenerative changes.  No

new/acute findings.

## 2021-11-22 VITALS — SYSTOLIC BLOOD PRESSURE: 152 MMHG | HEART RATE: 102 BPM | DIASTOLIC BLOOD PRESSURE: 89 MMHG

## 2021-11-22 VITALS — OXYGEN SATURATION: 93 %

## 2022-01-15 ENCOUNTER — HOSPITAL ENCOUNTER (OUTPATIENT)
Dept: HOSPITAL 33 - ED | Age: 87
Setting detail: OBSERVATION
LOS: 5 days | Discharge: NURSING, REHAB OR LONG-TERM CARE | End: 2022-01-20
Attending: FAMILY MEDICINE | Admitting: FAMILY MEDICINE
Payer: MEDICARE

## 2022-01-15 DIAGNOSIS — E86.0: ICD-10-CM
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
## 2022-01-15 DIAGNOSIS — R32: ICD-10-CM

## 2022-01-15 DIAGNOSIS — N39.0: Primary | ICD-10-CM

## 2022-01-15 DIAGNOSIS — I25.10: ICD-10-CM

## 2022-01-15 DIAGNOSIS — F03.90: ICD-10-CM

## 2022-01-15 DIAGNOSIS — E11.65: ICD-10-CM

## 2022-01-15 DIAGNOSIS — Z20.828: ICD-10-CM

## 2022-01-15 DIAGNOSIS — Z79.899: ICD-10-CM

## 2022-01-15 LAB
ALBUMIN SERPL-MCNC: 4.4 G/DL (ref 3.5–5)
ALP SERPL-CCNC: 97 U/L (ref 38–126)
ALT SERPL-CCNC: 23 U/L (ref 0–35)
ANION GAP SERPL CALC-SCNC: 16.5 MEQ/L (ref 5–15)
AST SERPL QL: 34 U/L (ref 14–36)
BASE EXCESS BLDV CALC-SCNC: 2.5 MMOL/L (ref -2–2)
BASOPHILS # BLD AUTO: 0.01 10*3/UL (ref 0–0.4)
BILIRUB BLD-MCNC: 1.4 MG/DL (ref 0.2–1.3)
BUN SERPL-MCNC: 22 MG/DL (ref 7–17)
CALCIUM SPEC-MCNC: 9.8 MG/DL (ref 8.4–10.2)
CHLORIDE SERPL-SCNC: 98 MMOL/L (ref 98–107)
CO2 SERPL-SCNC: 27 MMOL/L (ref 22–30)
COHGB MFR BLDV: 3.2 % T HGB (ref 0–6.9)
CREAT SERPL-MCNC: 1.13 MG/DL (ref 0.52–1.04)
EOSINOPHIL # BLD AUTO: 0 10*3/UL (ref 0–0.5)
FLUAV AG NPH QL IA: NEGATIVE
FLUBV AG NPH QL IA: NEGATIVE
GFR SERPLBLD BASED ON 1.73 SQ M-ARVRAT: 48.4 ML/MIN
GLUCOSE SERPL-MCNC: 401 MG/DL (ref 74–106)
GLUCOSE UR-MCNC: >=500 MG/DL
HCO3 BLDV-SCNC: 27.9 MEQ/L (ref 22–28)
HCT VFR BLD AUTO: 50.3 % (ref 35–47)
HGB BLD-MCNC: 15.7 GM/DL (ref 12–16)
HGB BLDV-MCNC: 16.6 G/DL
INHALED O2 CONCENTRATION: 21 %
LIPASE SERPL-CCNC: 112 U/L (ref 23–300)
LYMPHOCYTES # SPEC AUTO: 1.23 10*3/UL (ref 1–4.6)
MAGNESIUM SERPL-MCNC: 1.8 MG/DL (ref 1.6–2.3)
MCH RBC QN AUTO: 31.6 PG (ref 26–32)
MCHC RBC AUTO-ENTMCNC: 31.2 G/DL (ref 32–36)
MONOCYTES # BLD AUTO: 0.65 10*3/UL (ref 0–1.3)
PCO2 BLDV: 45 MM/HG (ref 42–55)
PLATELET # BLD AUTO: 153 K/MM3 (ref 150–450)
PO2 BLDV: 19 MM/HG (ref 25–40)
POTASSIUM BLDV-SCNC: 4.8 MMOL/L (ref 3.5–5.1)
POTASSIUM SERPLBLD-SCNC: 4.7 MMOL/L (ref 3.5–5.1)
PROT SERPL-MCNC: 7.8 G/DL (ref 6.3–8.2)
PROT UR STRIP-MCNC: 100 MG/DL
RBC # BLD AUTO: 4.97 M/MM3 (ref 4.1–5.4)
RBC #/AREA URNS HPF: (no result) /HPF (ref 0–2)
RSV AG SPEC QL IA: NEGATIVE
SAO2 % BLDV: 41.1 % (ref 95–100)
SARS-COV-2 AG RESP QL IA.RAPID: NEGATIVE
SODIUM SERPL-SCNC: 137 MMOL/L (ref 137–145)
WBC # BLD AUTO: 7.7 K/MM3 (ref 4–10.5)
WBC #/AREA URNS HPF: >100 /HPF (ref 0–5)

## 2022-01-15 PROCEDURE — 84145 PROCALCITONIN (PCT): CPT

## 2022-01-15 PROCEDURE — 84484 ASSAY OF TROPONIN QUANT: CPT

## 2022-01-15 PROCEDURE — 83036 HEMOGLOBIN GLYCOSYLATED A1C: CPT

## 2022-01-15 PROCEDURE — 83690 ASSAY OF LIPASE: CPT

## 2022-01-15 PROCEDURE — 97161 PT EVAL LOW COMPLEX 20 MIN: CPT

## 2022-01-15 PROCEDURE — 70450 CT HEAD/BRAIN W/O DYE: CPT

## 2022-01-15 PROCEDURE — 80053 COMPREHEN METABOLIC PANEL: CPT

## 2022-01-15 PROCEDURE — 97530 THERAPEUTIC ACTIVITIES: CPT

## 2022-01-15 PROCEDURE — 80048 BASIC METABOLIC PNL TOTAL CA: CPT

## 2022-01-15 PROCEDURE — 99285 EMERGENCY DEPT VISIT HI MDM: CPT

## 2022-01-15 PROCEDURE — 87040 BLOOD CULTURE FOR BACTERIA: CPT

## 2022-01-15 PROCEDURE — 96365 THER/PROPH/DIAG IV INF INIT: CPT

## 2022-01-15 PROCEDURE — 87086 URINE CULTURE/COLONY COUNT: CPT

## 2022-01-15 PROCEDURE — 85025 COMPLETE CBC W/AUTO DIFF WBC: CPT

## 2022-01-15 PROCEDURE — 82805 BLOOD GASES W/O2 SATURATION: CPT

## 2022-01-15 PROCEDURE — 83735 ASSAY OF MAGNESIUM: CPT

## 2022-01-15 PROCEDURE — 96374 THER/PROPH/DIAG INJ IV PUSH: CPT

## 2022-01-15 PROCEDURE — 36000 PLACE NEEDLE IN VEIN: CPT

## 2022-01-15 PROCEDURE — 83605 ASSAY OF LACTIC ACID: CPT

## 2022-01-15 PROCEDURE — 82947 ASSAY GLUCOSE BLOOD QUANT: CPT

## 2022-01-15 PROCEDURE — 81001 URINALYSIS AUTO W/SCOPE: CPT

## 2022-01-15 PROCEDURE — 0241U: CPT

## 2022-01-15 PROCEDURE — 36415 COLL VENOUS BLD VENIPUNCTURE: CPT

## 2022-01-15 PROCEDURE — 71045 X-RAY EXAM CHEST 1 VIEW: CPT

## 2022-01-15 PROCEDURE — 93005 ELECTROCARDIOGRAM TRACING: CPT

## 2022-01-15 NOTE — ERPHSYRPT
- History of Present Illness


Time Seen by Provider: 01/15/22 19:21


Source: patient


Exam Limitations: no limitations


Patient Subjective Stated Complaint: EMS states pt has high blood sugar


Triage Nursing Assessment: pt came into the er via ambulance; pt is axo x1; c/o 

hyperglycemia; pt denies pain; EMS states blood sugar of 376; EMS states that pt

had fall earlier this today; pt has small laceration to left ear; pt is 

incontinent, depend was saturated; blood sugar 417 on arrival; hypertension


Physician History: 





87-year-old female with a history of poorly controlled diabetes mellitus, 

advanced age dementia, multiple falls, multiple UTIs, chronic urinary 

incontinence is brought in the ER with increasing generalized weakness fatigue 

and tiredness.  She fell earlier this morning hitting her left ear without any 

loss of consciousness.  On EMS arrival patient refused to be seen.  Patient is 

brought in the ER again with worsening of her weakness and refusing to eat or 

drink.  Patient blood sugars in 400s on presentation..  Patient is unable to 

take care of herself and her  also reports it is getting too hard to take

care of her.  She has not been taking medications as recommended.


Timing/Duration: yesterday, gradual onset, worse


Severity: moderate


Associated Symptoms: loss of appetite, malaise, weakness


Allergies/Adverse Reactions: 








No Known Drug Allergies Allergy (Verified 01/15/22 19:13)


   





Home Medications: 








Atorvastatin Calcium [Lipitor] 10 mg PO DAILY 12/26/20 [History]


Linagliptin [Tradjenta] 5 mg PO DAILY 12/26/20 [History]


Lisinopril 20 mg*** [Zestril 20 MG***] 0.5 tab PO DAILY 11/20/21 [History]


Memantine HCl 5 mg*** [Namenda 5 MG***] 1 tab PO BID 11/20/21 [History]


Mirabegron [Myrbetriq] 1 tab PO DAILY 11/20/21 [History]


Rivaroxaban [Xarelto] 1 tab PO DAILY 11/20/21 [History]


Solifenacin Succinate [Vesicare] 1 tab PO DAILY 11/20/21 [History]


Insulin Glargine,Hum.rec.anlog [Lantus] 6 unit SQ AC 01/15/22 [History]





Hx Tetanus, Diphtheria Vaccination/Date Given: Yes


Hx Influenza Vaccination/Date Given: Yes


Hx Pneumococcal Vaccination/Date Given: Yes





Travel Risk





- International Travel


Have you traveled outside of the country in past 3 weeks: No





- Coronavirus Screening


Are you exhibiting any of the following symptoms?: No


Close contact with a COVID-19 positive Pt in past 14-21 Days: No





- Vaccine Status


Have you recieved a Covid-19 vaccination: Yes


: [employer]pted





- Review of Systems


Constitutional: Fatigue, Weakness


Genitourinary Symptoms: Incontinence


All Other Systems: Unable due to dementia





- Past Medical History


Pertinent Past Medical History: Yes


Neurological History: Dementia


Endocrine Medical History: Diabetes Type I


Other Medical History: poor historian





- Past Surgical History


Past Surgical History: Yes


Other Surgical History: cabg





- Social History


Smoking Status: Never smoker


Exposure to second hand smoke: No


Drug Use: none


Patient Lives Alone: No





- Female History


Hx Pregnant Now: No





- Nursing Vital Signs


Nursing Vital Signs: 


                               Initial Vital Signs











Temperature  99 F   01/15/22 19:13


 


Pulse Rate  82   01/15/22 19:13


 


Respiratory Rate  16   01/15/22 19:13


 


Blood Pressure  188/88   01/15/22 19:13


 


O2 Sat by Pulse Oximetry  92 L  01/15/22 19:13








                                   Pain Scale











Pain Intensity                 0

















- Physical Exam


General Appearance: no apparent distress, alert


Eye Exam: PERRL/EOMI, eyes nml inspection


Ears, Nose, Throat Exam: other (Superficial laceration with dried blood left 

external ear)


Neck Exam: normal inspection, non-tender, supple, full range of motion


Respiratory Exam: normal breath sounds, lungs clear


Cardiovascular Exam: regular rate/rhythm, normal heart sounds


Gastrointestinal/Abdomen Exam: soft, normal bowel sounds, No tenderness


Back Exam: normal inspection, normal range of motion


Neurologic Exam: alert, oriented x 3, cooperative, CNs II-XII nml as tested


Skin Exam: normal color


**SpO2 Interpretation**: normal


SpO2: 96


O2 Delivery: Room Air





- Course


EKG Interpreted by Me: RATE (72.  Atrial flutter), Left Axis Deviation, pr

olonged QT interval, Right Bundle Branch Block, Non-specific ST Changes


Ordered Tests: 


                               Active Orders 24 hr











 Category Date Time Status


 


 EKG-ER Only STAT Care  01/15/22 19:45 Active


 


 IV Insertion STAT Care  01/15/22 19:45 Active


 


 CHEST 1 VIEW (PORTABLE) Stat Exams  01/15/22 19:45 Taken


 


 HEAD WITHOUT CONTRAST [CT] Stat Exams  01/15/22 22:04 Ordered


 


 BLOOD CULTURE Stat Lab  01/15/22 18:40 Received


 


 CBC W DIFF Stat Lab  01/15/22 19:59 Completed


 


 CMP Stat Lab  01/15/22 19:59 Completed


 


 CULTURE,URINE Stat Lab  01/15/22 19:59 Received


 


 LIPASE Stat Lab  01/15/22 19:59 Completed


 


 Lactic Acid Stat Lab  01/15/22 20:49 Completed


 


 MAGNESIUM Stat Lab  01/15/22 19:59 Completed


 


 POCT GLUCOSE Stat Lab  01/15/22 19:31 Completed


 


 TROPONIN Q3H Lab  01/15/22 20:00 Completed


 


 TROPONIN Q3H Lab  01/15/22 22:45 Ordered


 


 TROPONIN Q3H Lab  01/16/22 01:45 Ordered


 


 TROPONIN Q3H Lab  01/16/22 04:45 Ordered


 


 TROPONIN Q3H Lab  01/16/22 07:45 Ordered


 


 UA W/RFX UR CULTURE Stat Lab  01/15/22 19:59 Completed


 


 VENOUS BLOOD GAS Stat Lab  01/15/22 20:49 Completed


 


 Transfer Order Routine Transfer  01/15/22 Ordered








Medication Summary











Generic Name Dose Route Start Last Admin





  Trade Name Freq  PRN Reason Stop Dose Admin


 


Sodium Chloride  1,000 mls @ 125 mls/hr  01/15/22 19:45  01/15/22 19:54





  Sodium Chloride 0.9% 1000 Ml  IV  02/14/22 19:44  125 mls/hr





  .Q8H WILBER   Administration














Discontinued Medications














Generic Name Dose Route Start Last Admin





  Trade Name Freq  PRN Reason Stop Dose Admin


 


Ceftriaxone Sodium/Dextrose  2 g in 50 mls @ 100 mls/hr  01/15/22 21:22  

01/15/22 21:59





  Rocephin 2 Gm-D5w 50ml Bag**  IV  01/15/22 21:51  Infused





  STAT STA   Infusion


 


Ceftriaxone Sodium/Dextrose  Confirm  01/15/22 21:27 





  Rocephin 2 Gm-D5w 50ml Bag**  Administered  01/15/22 21:28 





  Dose  





  2 g in 50 mls @ ud  





  IV  





  .STK-MED ONE  


 


Insulin Human Regular  8 unit  01/15/22 21:21  01/15/22 21:27





  Insulin Regular, Human 1 Unit  IV  01/15/22 21:22  8 unit





  STAT ONE   Administration


 


Insulin Human Regular  Confirm  01/15/22 21:27 





  Insulin Regular, Human 1 Unit  Administered  01/15/22 21:28 





  Dose  





  8 unit  





  .ROUTE  





  .STK-MED ONE  











Lab/Rad Data: 


                           Laboratory Result Diagrams





                                 01/15/22 19:59 





                                 01/15/22 19:59 





                               Laboratory Results











  01/15/22 01/15/22 01/15/22 Range/Units





  20:49 20:49 20:00 


 


WBC     (4.0-10.5)  K/mm3


 


RBC     (4.1-5.4)  M/mm3


 


Hgb     (12.0-16.0)  gm/dl


 


Hct     (35-47)  %


 


MCV     ()  fl


 


MCH     (26-32)  pg


 


MCHC     (32-36)  g/dl


 


RDW     (11.5-14.0)  %


 


Plt Count     (150-450)  K/mm3


 


MPV     (7.5-11.0)  fl


 


Gran %     (36.0-66.0)  %


 


Eos # (Auto)     (0-0.5)  


 


Absolute Lymphs (auto)     (1.0-4.6)  


 


Absolute Monos (auto)     (0.0-1.3)  


 


Lymphocytes %     (24.0-44.0)  %


 


Monocytes %     (0.0-12.0)  %


 


Eosinophils %     (0.00-5.0)  %


 


Basophils %     (0.0-0.4)  %


 


Absolute Granulocytes     (1.4-6.9)  


 


Basophils #     (0-0.4)  


 


pO2/FiO2 Ratio  21.0    %


 


VBG pH  7.40    (7.32-7.42)  


 


VBG pCO2 at Pat Temp  45    (42-55)  mm/Hg


 


VBG pO2 at Pat Temp  19 L    (25-40)  mm/Hg


 


VBG HCO3  27.9    (22-28)  meq/L


 


VBG O2 Sat (Jordan)  41.1 L    ()  


 


VBG Base Excess  2.5 H    (-2.0-2.0)  


 


VBG Hemoglobin  16.6    


 


VBG Carboxyhemoglobin  3.2    (0.0-6.9)  % T HGB


 


POC Potassium  4.8    (3.5-5.1)  


 


Sodium     (137-145)  mmol/L


 


Potassium     (3.5-5.1)  mmol/L


 


Chloride     ()  mmol/L


 


Carbon Dioxide     (22-30)  mmol/L


 


Anion Gap     (5-15)  MEQ/L


 


BUN     (7-17)  mg/dL


 


Creatinine     (0.52-1.04)  mg/dL


 


Estimated GFR     ML/MIN


 


Glucose     ()  mg/dL


 


POC Glucometer     (74 to 106)  mg/dL


 


Lactic Acid   2.4 H   (0.4-2.0)  


 


Calcium     (8.4-10.2)  mg/dL


 


Magnesium     (1.6-2.3)  mg/dL


 


Total Bilirubin     (0.2-1.3)  mg/dL


 


AST     (14-36)  U/L


 


ALT     (0-35)  U/L


 


Alkaline Phosphatase     ()  U/L


 


Troponin I    0.019  (0.000-0.034)  ng/mL


 


Serum Total Protein     (6.3-8.2)  g/dL


 


Albumin     (3.5-5.0)  g/dL


 


Lipase     ()  U/L


 


Urine Color     (YELLOW)  


 


Urine Appearance     (CLEAR)  


 


Urine pH     (5-6)  


 


Ur Specific Gravity     (1.005-1.025)  


 


Urine Protein     (Negative)  


 


Urine Ketones     (NEGATIVE)  


 


Urine Blood     (0-5)  Kit/ul


 


Urine Nitrite     (NEGATIVE)  


 


Urine Bilirubin     (NEGATIVE)  


 


Urine Urobilinogen     (0-1)  mg/dL


 


Ur Leukocyte Esterase     (NEGATIVE)  


 


Urine WBC (Auto)     (0-5)  /HPF


 


Urine RBC (Auto)     (0-2)  /HPF


 


U Epithel Cells (Auto)     (FEW)  /HPF


 


Urine Bacteria (Auto)     (NEGATIVE)  /HPF


 


Urine Culture Reflexed     (NO)  


 


Urine Glucose     (NEGATIVE)  mg/dL














  01/15/22 01/15/22 01/15/22 Range/Units





  19:59 19:59 19:59 


 


WBC   7.7   (4.0-10.5)  K/mm3


 


RBC   4.97   (4.1-5.4)  M/mm3


 


Hgb   15.7   (12.0-16.0)  gm/dl


 


Hct   50.3 H   (35-47)  %


 


MCV   101.2 H   ()  fl


 


MCH   31.6   (26-32)  pg


 


MCHC   31.2 L   (32-36)  g/dl


 


RDW   14.6 H   (11.5-14.0)  %


 


Plt Count   153   (150-450)  K/mm3


 


MPV   10.8   (7.5-11.0)  fl


 


Gran %   75.4 H   (36.0-66.0)  %


 


Eos # (Auto)   0   (0-0.5)  


 


Absolute Lymphs (auto)   1.23   (1.0-4.6)  


 


Absolute Monos (auto)   0.65   (0.0-1.3)  


 


Lymphocytes %   16.0 L   (24.0-44.0)  %


 


Monocytes %   8.5   (0.0-12.0)  %


 


Eosinophils %   0.0   (0.00-5.0)  %


 


Basophils %   0.1   (0.0-0.4)  %


 


Absolute Granulocytes   5.78   (1.4-6.9)  


 


Basophils #   0.01   (0-0.4)  


 


pO2/FiO2 Ratio     %


 


VBG pH     (7.32-7.42)  


 


VBG pCO2 at Pat Temp     (42-55)  mm/Hg


 


VBG pO2 at Pat Temp     (25-40)  mm/Hg


 


VBG HCO3     (22-28)  meq/L


 


VBG O2 Sat (Jordan)     ()  


 


VBG Base Excess     (-2.0-2.0)  


 


VBG Hemoglobin     


 


VBG Carboxyhemoglobin     (0.0-6.9)  % T HGB


 


POC Potassium     (3.5-5.1)  


 


Sodium  137    (137-145)  mmol/L


 


Potassium  4.7    (3.5-5.1)  mmol/L


 


Chloride  98    ()  mmol/L


 


Carbon Dioxide  27    (22-30)  mmol/L


 


Anion Gap  16.5 H    (5-15)  MEQ/L


 


BUN  22 H    (7-17)  mg/dL


 


Creatinine  1.13 H    (0.52-1.04)  mg/dL


 


Estimated GFR  48.4    ML/MIN


 


Glucose  401 H    ()  mg/dL


 


POC Glucometer     (74 to 106)  mg/dL


 


Lactic Acid     (0.4-2.0)  


 


Calcium  9.8    (8.4-10.2)  mg/dL


 


Magnesium  1.8    (1.6-2.3)  mg/dL


 


Total Bilirubin  1.40 H    (0.2-1.3)  mg/dL


 


AST  34    (14-36)  U/L


 


ALT  23    (0-35)  U/L


 


Alkaline Phosphatase  97    ()  U/L


 


Troponin I     (0.000-0.034)  ng/mL


 


Serum Total Protein  7.8    (6.3-8.2)  g/dL


 


Albumin  4.4    (3.5-5.0)  g/dL


 


Lipase  112    ()  U/L


 


Urine Color    YELLOW  (YELLOW)  


 


Urine Appearance    CLOUDY  (CLEAR)  


 


Urine pH    5.0  (5-6)  


 


Ur Specific Gravity    1.021  (1.005-1.025)  


 


Urine Protein    100  (Negative)  


 


Urine Ketones    NEGATIVE  (NEGATIVE)  


 


Urine Blood    MODERATE  (0-5)  Kit/ul


 


Urine Nitrite    NEGATIVE  (NEGATIVE)  


 


Urine Bilirubin    NEGATIVE  (NEGATIVE)  


 


Urine Urobilinogen    NEGATIVE  (0-1)  mg/dL


 


Ur Leukocyte Esterase    LARGE  (NEGATIVE)  


 


Urine WBC (Auto)    >100  (0-5)  /HPF


 


Urine RBC (Auto)    6-10  (0-2)  /HPF


 


U Epithel Cells (Auto)    NONE  (FEW)  /HPF


 


Urine Bacteria (Auto)    FEW  (NEGATIVE)  /HPF


 


Urine Culture Reflexed    YES  (NO)  


 


Urine Glucose    >=500  (NEGATIVE)  mg/dL














  01/15/22 Range/Units





  19:31 


 


WBC   (4.0-10.5)  K/mm3


 


RBC   (4.1-5.4)  M/mm3


 


Hgb   (12.0-16.0)  gm/dl


 


Hct   (35-47)  %


 


MCV   ()  fl


 


MCH   (26-32)  pg


 


MCHC   (32-36)  g/dl


 


RDW   (11.5-14.0)  %


 


Plt Count   (150-450)  K/mm3


 


MPV   (7.5-11.0)  fl


 


Gran %   (36.0-66.0)  %


 


Eos # (Auto)   (0-0.5)  


 


Absolute Lymphs (auto)   (1.0-4.6)  


 


Absolute Monos (auto)   (0.0-1.3)  


 


Lymphocytes %   (24.0-44.0)  %


 


Monocytes %   (0.0-12.0)  %


 


Eosinophils %   (0.00-5.0)  %


 


Basophils %   (0.0-0.4)  %


 


Absolute Granulocytes   (1.4-6.9)  


 


Basophils #   (0-0.4)  


 


pO2/FiO2 Ratio   %


 


VBG pH   (7.32-7.42)  


 


VBG pCO2 at Pat Temp   (42-55)  mm/Hg


 


VBG pO2 at Pat Temp   (25-40)  mm/Hg


 


VBG HCO3   (22-28)  meq/L


 


VBG O2 Sat (Jordan)   ()  


 


VBG Base Excess   (-2.0-2.0)  


 


VBG Hemoglobin   


 


VBG Carboxyhemoglobin   (0.0-6.9)  % T HGB


 


POC Potassium   (3.5-5.1)  


 


Sodium   (137-145)  mmol/L


 


Potassium   (3.5-5.1)  mmol/L


 


Chloride   ()  mmol/L


 


Carbon Dioxide   (22-30)  mmol/L


 


Anion Gap   (5-15)  MEQ/L


 


BUN   (7-17)  mg/dL


 


Creatinine   (0.52-1.04)  mg/dL


 


Estimated GFR   ML/MIN


 


Glucose   ()  mg/dL


 


POC Glucometer  417 H  (74 to 106)  mg/dL


 


Lactic Acid   (0.4-2.0)  


 


Calcium   (8.4-10.2)  mg/dL


 


Magnesium   (1.6-2.3)  mg/dL


 


Total Bilirubin   (0.2-1.3)  mg/dL


 


AST   (14-36)  U/L


 


ALT   (0-35)  U/L


 


Alkaline Phosphatase   ()  U/L


 


Troponin I   (0.000-0.034)  ng/mL


 


Serum Total Protein   (6.3-8.2)  g/dL


 


Albumin   (3.5-5.0)  g/dL


 


Lipase   ()  U/L


 


Urine Color   (YELLOW)  


 


Urine Appearance   (CLEAR)  


 


Urine pH   (5-6)  


 


Ur Specific Gravity   (1.005-1.025)  


 


Urine Protein   (Negative)  


 


Urine Ketones   (NEGATIVE)  


 


Urine Blood   (0-5)  Kit/ul


 


Urine Nitrite   (NEGATIVE)  


 


Urine Bilirubin   (NEGATIVE)  


 


Urine Urobilinogen   (0-1)  mg/dL


 


Ur Leukocyte Esterase   (NEGATIVE)  


 


Urine WBC (Auto)   (0-5)  /HPF


 


Urine RBC (Auto)   (0-2)  /HPF


 


U Epithel Cells (Auto)   (FEW)  /HPF


 


Urine Bacteria (Auto)   (NEGATIVE)  /HPF


 


Urine Culture Reflexed   (NO)  


 


Urine Glucose   (NEGATIVE)  mg/dL














- Progress


Progress: improved


Progress Note: 





01/15/22 22:40


37-year-old is evaluated for generalized weakness fatigue tiredness and 

uncontrolled sugar.  She is given fluids, work-up showed mild ISABELLA and UTI, 

started on antibiotics.  Chest x-ray negative for any acute cardiopulmonary 

findings, reviewed by me official report is pending.  Given IV insulin for 

hyperglycemia.  I have also obtain CT head because of patient's fall which is 

negative.  Nonfocal neuro exam.  Discussed with Dr. Tijerina and patient is being

 admitted for observation.


01/15/22 22:41





Discussed with : Steve


Will see patient in: hospital (observation)


Counseled pt/family regarding: lab results, diagnosis, rad results





- Departure


Departure Disposition: Observation


Clinical Impression: 


 Hyperglycemia, Generalized weakness, Acute UTI, ISABELLA (acute kidney injury)





Condition: Stable


Critical Care Time: No


Referrals: 


SANJEEV CUNNINGHAM MD [Primary Care Provider] - Follow up/PCP as directed

## 2022-01-16 LAB
ALBUMIN SERPL-MCNC: 3.1 G/DL (ref 3.5–5)
ALP SERPL-CCNC: 77 U/L (ref 38–126)
ALT SERPL-CCNC: 18 U/L (ref 0–35)
ANION GAP SERPL CALC-SCNC: 11.9 MEQ/L (ref 5–15)
AST SERPL QL: 27 U/L (ref 14–36)
BASOPHILS # BLD AUTO: 0.01 10*3/UL (ref 0–0.4)
BILIRUB BLD-MCNC: 1 MG/DL (ref 0.2–1.3)
BUN SERPL-MCNC: 20 MG/DL (ref 7–17)
CALCIUM SPEC-MCNC: 8.8 MG/DL (ref 8.4–10.2)
CHLORIDE SERPL-SCNC: 109 MMOL/L (ref 98–107)
CO2 SERPL-SCNC: 24 MMOL/L (ref 22–30)
CREAT SERPL-MCNC: 1.07 MG/DL (ref 0.52–1.04)
EOSINOPHIL # BLD AUTO: 0.02 10*3/UL (ref 0–0.5)
GFR SERPLBLD BASED ON 1.73 SQ M-ARVRAT: 51.6 ML/MIN
GLUCOSE SERPL-MCNC: 267 MG/DL (ref 74–106)
HCT VFR BLD AUTO: 41.6 % (ref 35–47)
HGB BLD-MCNC: 13.4 GM/DL (ref 12–16)
LYMPHOCYTES # SPEC AUTO: 2.27 10*3/UL (ref 1–4.6)
MCH RBC QN AUTO: 32.1 PG (ref 26–32)
MCHC RBC AUTO-ENTMCNC: 32.2 G/DL (ref 32–36)
MONOCYTES # BLD AUTO: 0.67 10*3/UL (ref 0–1.3)
PLATELET # BLD AUTO: 156 K/MM3 (ref 150–450)
POTASSIUM SERPLBLD-SCNC: 4.2 MMOL/L (ref 3.5–5.1)
PROT SERPL-MCNC: 6.1 G/DL (ref 6.3–8.2)
RBC # BLD AUTO: 4.18 M/MM3 (ref 4.1–5.4)
SODIUM SERPL-SCNC: 141 MMOL/L (ref 137–145)
WBC # BLD AUTO: 7.3 K/MM3 (ref 4–10.5)

## 2022-01-16 RX ADMIN — PANTOPRAZOLE SODIUM SCH MG: 40 INJECTION, POWDER, FOR SOLUTION INTRAVENOUS at 09:03

## 2022-01-16 RX ADMIN — ACETAMINOPHEN PRN MG: 325 TABLET ORAL at 03:19

## 2022-01-16 RX ADMIN — SITAGLIPTIN SCH MG: 50 TABLET, FILM COATED ORAL at 12:11

## 2022-01-16 RX ADMIN — INSULIN HUMAN SCH MLS/HR: 100 INJECTION, SOLUTION PARENTERAL at 16:03

## 2022-01-16 RX ADMIN — HYDROCHLOROTHIAZIDE SCH MG: 25 TABLET ORAL at 12:32

## 2022-01-16 RX ADMIN — RIVAROXABAN SCH MG: 10 TABLET, FILM COATED ORAL at 17:01

## 2022-01-16 RX ADMIN — INSULIN LISPRO SCH UNIT: 100 INJECTION, SOLUTION INTRAVENOUS; SUBCUTANEOUS at 16:59

## 2022-01-16 RX ADMIN — INSULIN LISPRO SCH UNIT: 100 INJECTION, SOLUTION INTRAVENOUS; SUBCUTANEOUS at 12:35

## 2022-01-16 RX ADMIN — CEFTRIAXONE SCH MLS/HR: 1 INJECTION, SOLUTION INTRAVENOUS at 22:03

## 2022-01-16 NOTE — XRAY
Indication: Weakness.



Comparison: November 20, 2021.



Portable chest less inflated and remains clear.  Heart not enlarged again with

CABG.  Bony thorax intact again with osteopenia and degenerative changes.  No

new/acute findings.

## 2022-01-16 NOTE — XRAY
Indication: Blunt trauma following fall.



Multiple contiguous axial images obtained through the head without contrast.



Comparison: November 20, 2021.



Again age-appropriate global atrophy, mild periventricular degenerative

micro-ischemia, and remote lacunar infarct right external capsule.  No acute

intracranial hemorrhage, abnormal extra-axial fluid collection, or mass

effect.  Fourth ventricle is midline without hydrocephalus.  Bony calvarium

intact.  Visualized paranasal sinuses and mastoid air cells are clear.



Impression: Continued nonacute senile brain with remote lacunar infarct right

external capsule.



Comment: Preliminary interpretation made by C.  No critical discrepancy.

## 2022-01-17 LAB
BASOPHILS # BLD AUTO: 0.01 10*3/UL (ref 0–0.4)
EOSINOPHIL # BLD AUTO: 0.04 10*3/UL (ref 0–0.5)
HCT VFR BLD AUTO: 40.3 % (ref 35–47)
HGB BLD-MCNC: 13.1 GM/DL (ref 12–16)
LYMPHOCYTES # SPEC AUTO: 2.07 10*3/UL (ref 1–4.6)
MCH RBC QN AUTO: 32.3 PG (ref 26–32)
MCHC RBC AUTO-ENTMCNC: 32.5 G/DL (ref 32–36)
MONOCYTES # BLD AUTO: 0.55 10*3/UL (ref 0–1.3)
PLATELET # BLD AUTO: 147 K/MM3 (ref 150–450)
RBC # BLD AUTO: 4.05 M/MM3 (ref 4.1–5.4)
WBC # BLD AUTO: 6.3 K/MM3 (ref 4–10.5)

## 2022-01-17 RX ADMIN — HYDROCHLOROTHIAZIDE SCH MG: 25 TABLET ORAL at 10:04

## 2022-01-17 RX ADMIN — INSULIN HUMAN SCH MLS/HR: 100 INJECTION, SOLUTION PARENTERAL at 16:07

## 2022-01-17 RX ADMIN — RIVAROXABAN SCH MG: 10 TABLET, FILM COATED ORAL at 17:51

## 2022-01-17 RX ADMIN — INSULIN LISPRO SCH UNIT: 100 INJECTION, SOLUTION INTRAVENOUS; SUBCUTANEOUS at 07:55

## 2022-01-17 RX ADMIN — PANTOPRAZOLE SODIUM SCH MG: 40 INJECTION, POWDER, FOR SOLUTION INTRAVENOUS at 10:04

## 2022-01-17 RX ADMIN — SIMVASTATIN SCH MG: 10 TABLET, FILM COATED ORAL at 10:04

## 2022-01-17 RX ADMIN — INSULIN LISPRO SCH: 100 INJECTION, SOLUTION INTRAVENOUS; SUBCUTANEOUS at 00:05

## 2022-01-17 RX ADMIN — ACETAMINOPHEN PRN MG: 325 TABLET ORAL at 12:59

## 2022-01-17 RX ADMIN — SITAGLIPTIN SCH MG: 50 TABLET, FILM COATED ORAL at 10:04

## 2022-01-17 RX ADMIN — INSULIN LISPRO SCH UNIT: 100 INJECTION, SOLUTION INTRAVENOUS; SUBCUTANEOUS at 16:19

## 2022-01-17 RX ADMIN — INSULIN LISPRO SCH UNIT: 100 INJECTION, SOLUTION INTRAVENOUS; SUBCUTANEOUS at 12:06

## 2022-01-17 RX ADMIN — CEFTRIAXONE SCH MLS/HR: 1 INJECTION, SOLUTION INTRAVENOUS at 22:15

## 2022-01-17 NOTE — HP
CHIEF COMPLAINT:  High sugars and fall. 



HISTORY OF PRESENT ILLNESS:  The patient is an 87-year-old white female living with her 
89-year-old . The patient has issues with dementia. Apparently she had fallen and 
summoned EMS and they came to check her out but she sent them away and refused to be taken 
to the hospital but they were summoned yet again and then this time they did take her to 
the emergency room. It was found that her blood sugar by EMS was 376 and 417 on arrival to 
the emergency room. 



PAST MEDICAL/SURGICAL HISTORY: The patient has history of dementia and diabetes mellitus 
type II. She has incontinence and dementia undetermined type. She previously had a 
coronary artery bypass graft surgery and therefore has coronary artery disease. 



 MEDICATIONS: Atorvastatin 10 mg a day. She takes Lantus insulin 6 units subcu and a.c. 
according to the chart record. Tradjenta 5 mg daily, lisinopril 20 mg a day, memantine 5 
mg a day, Myrbetriq 1 mg a day, Xarelto 1 mg a day, Vesicare 1 tablet daily. This list is 
subject to change as the patient is a very poor historian and we have no list currently of 
the patient's regular doctor. 



ALLERGIES:  NKDA.  



PHYSICAL EXAMINATION:  The patient's vital signs on admission showed her temperature to be 
99.0F, pulse 82, respiratory rate 16 and blood pressure 188/88.  O2 saturation is 92%. 

HEENT:  Appears to be normocephalic and atraumatic. There is slight superficial laceration 
noted on the left external ear. Otherwise, no problems were seen. Pupils appeared to be 
equal round reactive to light. Extraocular movements intact. Oropharynx is pink and moist.

NECK:  Supple without lymphadenopathy, thyromegaly or JVD. 

CHEST: Clear to auscultation. 

HEART: Currently regular rate and rhythm without murmurs, rubs or gallops but a midline 
sternotomy scar. 

ABDOMEN: Soft. No palpable masses.

EXTREMITIES:  Without cyanosis, clubbing or edema. 

NEUROLOGIC: The patient currently appears to be awake and alert but confused. There does 
not appear to be any focal neurological deficits.



LAB DATA AND TESTS:  The patient's laboratory studies in the emergency room revealed CBC 
with white count 7.7, hemoglobin 15.7, PLT count 153,000.  Lactic acid was 2.4 initially. 
Venous blood gas showed a pH of 7.40, pCO2 of 45. The metabolic panel showed blood sugar 
of 401,000 with BUN 22, creatinine 1.13. Total bilirubin slightly elevated at 1.40. Lipase 
was normal. UA showed protein and glucose that were greater than 100 white blood cells per 
high power field. Troponin was 0.019.  She was negative on the COVID test, respiratory 
syncytial virus and influenza. Lactic acid repeat was 1.4 after fluids. CT scan of the 
head with nonacute senile brain with remote lacunar infarct in the right external capsule. 
Chest x-ray remains clear.



ASSESSMENT:  A patient with urinary tract infection, mild dehydration, recent fall and 
elevated blood sugars, uncontrolled diabetes mellitus type II, will recheck the patient's 
A1C level, place her on sliding scale coverage with Humalog a.c. and h.s.  The patient 
will be placed on antibiotics to cover what appears to be urinary tract infection. 
Cultures are currently pending.

## 2022-01-18 LAB
ANION GAP SERPL CALC-SCNC: 10.3 MEQ/L (ref 5–15)
BASOPHILS # BLD AUTO: 0.01 10*3/UL (ref 0–0.4)
BUN SERPL-MCNC: 16 MG/DL (ref 7–17)
CALCIUM SPEC-MCNC: 8.4 MG/DL (ref 8.4–10.2)
CHLORIDE SERPL-SCNC: 106 MMOL/L (ref 98–107)
CO2 SERPL-SCNC: 25 MMOL/L (ref 22–30)
CREAT SERPL-MCNC: 1.09 MG/DL (ref 0.52–1.04)
EOSINOPHIL # BLD AUTO: 0.09 10*3/UL (ref 0–0.5)
GFR SERPLBLD BASED ON 1.73 SQ M-ARVRAT: 50.5 ML/MIN
GLUCOSE SERPL-MCNC: 193 MG/DL (ref 74–106)
HCT VFR BLD AUTO: 39.4 % (ref 35–47)
HGB BLD-MCNC: 12.8 GM/DL (ref 12–16)
LYMPHOCYTES # SPEC AUTO: 1.61 10*3/UL (ref 1–4.6)
MCH RBC QN AUTO: 32.7 PG (ref 26–32)
MCHC RBC AUTO-ENTMCNC: 32.5 G/DL (ref 32–36)
MONOCYTES # BLD AUTO: 0.56 10*3/UL (ref 0–1.3)
PLATELET # BLD AUTO: 152 K/MM3 (ref 150–450)
POTASSIUM SERPLBLD-SCNC: 4.1 MMOL/L (ref 3.5–5.1)
PROCALCITONIN SERPL-MCNC: 0.13 NG/ML (ref 0.03–0.08)
RBC # BLD AUTO: 3.91 M/MM3 (ref 4.1–5.4)
SODIUM SERPL-SCNC: 138 MMOL/L (ref 137–145)
WBC # BLD AUTO: 6.9 K/MM3 (ref 4–10.5)

## 2022-01-18 RX ADMIN — RIVAROXABAN SCH MG: 10 TABLET, FILM COATED ORAL at 17:34

## 2022-01-18 RX ADMIN — SITAGLIPTIN SCH MG: 50 TABLET, FILM COATED ORAL at 10:05

## 2022-01-18 RX ADMIN — SIMVASTATIN SCH MG: 10 TABLET, FILM COATED ORAL at 10:05

## 2022-01-18 RX ADMIN — INSULIN LISPRO SCH UNIT: 100 INJECTION, SOLUTION INTRAVENOUS; SUBCUTANEOUS at 22:59

## 2022-01-18 RX ADMIN — HYDROCHLOROTHIAZIDE SCH MG: 25 TABLET ORAL at 10:05

## 2022-01-18 RX ADMIN — INSULIN LISPRO SCH: 100 INJECTION, SOLUTION INTRAVENOUS; SUBCUTANEOUS at 00:50

## 2022-01-18 RX ADMIN — INSULIN LISPRO SCH: 100 INJECTION, SOLUTION INTRAVENOUS; SUBCUTANEOUS at 12:59

## 2022-01-18 RX ADMIN — PANTOPRAZOLE SODIUM SCH MG: 40 INJECTION, POWDER, FOR SOLUTION INTRAVENOUS at 10:05

## 2022-01-18 RX ADMIN — INSULIN LISPRO SCH UNIT: 100 INJECTION, SOLUTION INTRAVENOUS; SUBCUTANEOUS at 17:32

## 2022-01-18 RX ADMIN — INSULIN LISPRO SCH: 100 INJECTION, SOLUTION INTRAVENOUS; SUBCUTANEOUS at 08:22

## 2022-01-19 RX ADMIN — SITAGLIPTIN SCH MG: 50 TABLET, FILM COATED ORAL at 12:09

## 2022-01-19 RX ADMIN — SIMVASTATIN SCH MG: 10 TABLET, FILM COATED ORAL at 12:10

## 2022-01-19 RX ADMIN — HYDROCHLOROTHIAZIDE SCH MG: 25 TABLET ORAL at 12:10

## 2022-01-19 RX ADMIN — INSULIN LISPRO SCH UNIT: 100 INJECTION, SOLUTION INTRAVENOUS; SUBCUTANEOUS at 12:10

## 2022-01-19 RX ADMIN — INSULIN LISPRO SCH UNIT: 100 INJECTION, SOLUTION INTRAVENOUS; SUBCUTANEOUS at 21:33

## 2022-01-19 RX ADMIN — INSULIN LISPRO SCH: 100 INJECTION, SOLUTION INTRAVENOUS; SUBCUTANEOUS at 09:05

## 2022-01-19 RX ADMIN — INSULIN LISPRO SCH UNIT: 100 INJECTION, SOLUTION INTRAVENOUS; SUBCUTANEOUS at 18:27

## 2022-01-19 RX ADMIN — RIVAROXABAN SCH MG: 10 TABLET, FILM COATED ORAL at 18:25

## 2022-01-19 NOTE — DS
DISCHARGE DIAGNOSES: 

1) URINARY TRACT INFECTION. 

2) DIABETES MELLITUS TYPE II, OUT OF CONTROL.

3) DEMENTIA. 



HOSPITAL COURSE:  The patient is an 87-year-old white female living at home with her 
. She became weaker over the past few days and unable to ambulate. She had fallen 
once at home. Her  lives with her but is also having some issues consistent with 
dementia as well and is unable to take care of her in the home by himself. The patient was 
admitted to the hospital for what appeared to be urinary tract infection with greater than 
100 white blood cells per high power field and urine culture was negative for a growth. 
The patient did have blood culture positive for gram-positive cocci in clusters with 
unknown source of origin. The patient was placed on Vancomycin and had been on Rocephin 
previously. The patient has done fairly well as far as overall. Health is now so weak that 
she cannot get up and move without assist of two. The patient was felt the need to go to 
rehab for evaluation and management. The patient's A1C was checked while she was here and 
was found to be 11.66. Her blood sugar on the morning of 01/18/2022 was 193 fasting, BUN 
16, creatinine 1.09. Liver enzymes were normal. The procalcitonin was down to 0.130.  She 
did have a slightly elevated lactic acid of 2.4 when she had initially presented to the 
hospital. The patient's white count has remained essentially normal. Her hemoglobin was 
12.8 and PLT count was 152,000. The patient now is stable to go to rehab from our facility 
and [facility] apparently has a bed for her presently. The patient will be discharged 
with no additional antibiotics at this time as the cultures were negative and the blood 
culture was most likely Staph epi.  The patient was then placed on a sliding scale 
coverage for her blood sugars at this time and that will continue at the rehab facility. 
The patient's medications otherwise remain the same and she is discharged on atorvastatin 
10 mg a day, Lantus 6 units in the evening, Tradjenta has been replaced with Humalog, 
lisinopril at 20 mg daily, memantine 5 mg a day, Vesicare 1 tablet a day, Xarelto 1 mg 
daily.

## 2022-01-20 VITALS — SYSTOLIC BLOOD PRESSURE: 139 MMHG | HEART RATE: 100 BPM | DIASTOLIC BLOOD PRESSURE: 67 MMHG | OXYGEN SATURATION: 97 %

## 2022-01-20 RX ADMIN — SITAGLIPTIN SCH MG: 50 TABLET, FILM COATED ORAL at 09:43

## 2022-01-20 RX ADMIN — SIMVASTATIN SCH MG: 10 TABLET, FILM COATED ORAL at 09:43

## 2022-01-20 RX ADMIN — INSULIN LISPRO SCH UNIT: 100 INJECTION, SOLUTION INTRAVENOUS; SUBCUTANEOUS at 07:43

## 2022-01-20 RX ADMIN — HYDROCHLOROTHIAZIDE SCH MG: 25 TABLET ORAL at 09:43

## 2022-01-20 RX ADMIN — INSULIN LISPRO SCH UNIT: 100 INJECTION, SOLUTION INTRAVENOUS; SUBCUTANEOUS at 11:53
